# Patient Record
Sex: FEMALE | Race: WHITE | NOT HISPANIC OR LATINO | Employment: OTHER | URBAN - NONMETROPOLITAN AREA
[De-identification: names, ages, dates, MRNs, and addresses within clinical notes are randomized per-mention and may not be internally consistent; named-entity substitution may affect disease eponyms.]

---

## 2018-01-05 ENCOUNTER — TRANSCRIBE ORDERS (OUTPATIENT)
Dept: ADMINISTRATIVE | Facility: HOSPITAL | Age: 63
End: 2018-01-05

## 2018-01-05 ENCOUNTER — APPOINTMENT (OUTPATIENT)
Dept: LAB | Facility: HOSPITAL | Age: 63
End: 2018-01-05
Payer: COMMERCIAL

## 2018-01-05 DIAGNOSIS — I10 ESSENTIAL HYPERTENSION, MALIGNANT: Primary | ICD-10-CM

## 2018-01-05 DIAGNOSIS — E03.9 MYXEDEMA HEART DISEASE: ICD-10-CM

## 2018-01-05 DIAGNOSIS — E78.5 HYPERLIPIDEMIA, UNSPECIFIED HYPERLIPIDEMIA TYPE: ICD-10-CM

## 2018-01-05 DIAGNOSIS — I51.9 MYXEDEMA HEART DISEASE: ICD-10-CM

## 2018-01-05 DIAGNOSIS — I10 ESSENTIAL HYPERTENSION, MALIGNANT: ICD-10-CM

## 2018-01-05 LAB
25(OH)D3 SERPL-MCNC: 44.4 NG/ML (ref 30–100)
ALBUMIN SERPL BCP-MCNC: 3.3 G/DL (ref 3.5–5)
ALP SERPL-CCNC: 79 U/L (ref 46–116)
ALT SERPL W P-5'-P-CCNC: 30 U/L (ref 12–78)
ANION GAP SERPL CALCULATED.3IONS-SCNC: 9 MMOL/L (ref 4–13)
AST SERPL W P-5'-P-CCNC: 22 U/L (ref 5–45)
BASOPHILS # BLD AUTO: 0.02 THOUSANDS/ΜL (ref 0–0.1)
BASOPHILS NFR BLD AUTO: 0 % (ref 0–1)
BILIRUB SERPL-MCNC: 0.3 MG/DL (ref 0.2–1)
BUN SERPL-MCNC: 12 MG/DL (ref 5–25)
CALCIUM SERPL-MCNC: 8.9 MG/DL (ref 8.3–10.1)
CHLORIDE SERPL-SCNC: 106 MMOL/L (ref 100–108)
CHOLEST SERPL-MCNC: 158 MG/DL (ref 50–200)
CO2 SERPL-SCNC: 28 MMOL/L (ref 21–32)
CREAT SERPL-MCNC: 0.86 MG/DL (ref 0.6–1.3)
EOSINOPHIL # BLD AUTO: 0.37 THOUSAND/ΜL (ref 0–0.61)
EOSINOPHIL NFR BLD AUTO: 4 % (ref 0–6)
ERYTHROCYTE [DISTWIDTH] IN BLOOD BY AUTOMATED COUNT: 13.2 % (ref 11.6–15.1)
GFR SERPL CREATININE-BSD FRML MDRD: 74 ML/MIN/1.73SQ M
GLUCOSE P FAST SERPL-MCNC: 94 MG/DL (ref 65–99)
HCT VFR BLD AUTO: 42.4 % (ref 34.8–46.1)
HDLC SERPL-MCNC: 59 MG/DL (ref 40–60)
HGB BLD-MCNC: 14.5 G/DL (ref 11.5–15.4)
LDLC SERPL CALC-MCNC: 82 MG/DL (ref 0–100)
LYMPHOCYTES # BLD AUTO: 2.97 THOUSANDS/ΜL (ref 0.6–4.47)
LYMPHOCYTES NFR BLD AUTO: 35 % (ref 14–44)
MCH RBC QN AUTO: 30.1 PG (ref 26.8–34.3)
MCHC RBC AUTO-ENTMCNC: 34.2 G/DL (ref 31.4–37.4)
MCV RBC AUTO: 88 FL (ref 82–98)
MONOCYTES # BLD AUTO: 0.6 THOUSAND/ΜL (ref 0.17–1.22)
MONOCYTES NFR BLD AUTO: 7 % (ref 4–12)
NEUTROPHILS # BLD AUTO: 4.65 THOUSANDS/ΜL (ref 1.85–7.62)
NEUTS SEG NFR BLD AUTO: 54 % (ref 43–75)
PLATELET # BLD AUTO: 304 THOUSANDS/UL (ref 149–390)
PMV BLD AUTO: 9.8 FL (ref 8.9–12.7)
POTASSIUM SERPL-SCNC: 4 MMOL/L (ref 3.5–5.3)
PROT SERPL-MCNC: 6.4 G/DL (ref 6.4–8.2)
RBC # BLD AUTO: 4.81 MILLION/UL (ref 3.81–5.12)
SODIUM SERPL-SCNC: 143 MMOL/L (ref 136–145)
TRIGL SERPL-MCNC: 83 MG/DL
TSH SERPL DL<=0.05 MIU/L-ACNC: 3.65 UIU/ML (ref 0.36–3.74)
WBC # BLD AUTO: 8.61 THOUSAND/UL (ref 4.31–10.16)

## 2018-01-05 PROCEDURE — 82306 VITAMIN D 25 HYDROXY: CPT

## 2018-01-05 PROCEDURE — 84443 ASSAY THYROID STIM HORMONE: CPT

## 2018-01-05 PROCEDURE — 36415 COLL VENOUS BLD VENIPUNCTURE: CPT

## 2018-01-05 PROCEDURE — 85025 COMPLETE CBC W/AUTO DIFF WBC: CPT

## 2018-01-05 PROCEDURE — 80053 COMPREHEN METABOLIC PANEL: CPT

## 2018-01-05 PROCEDURE — 80061 LIPID PANEL: CPT

## 2018-09-17 ENCOUNTER — APPOINTMENT (OUTPATIENT)
Dept: LAB | Facility: HOSPITAL | Age: 63
End: 2018-09-17
Payer: COMMERCIAL

## 2018-09-17 ENCOUNTER — TRANSCRIBE ORDERS (OUTPATIENT)
Dept: ADMINISTRATIVE | Facility: HOSPITAL | Age: 63
End: 2018-09-17

## 2018-09-17 LAB
BUN SERPL-MCNC: 18 MG/DL (ref 5–25)
CREAT SERPL-MCNC: 0.84 MG/DL (ref 0.6–1.3)
GFR SERPL CREATININE-BSD FRML MDRD: 76 ML/MIN/1.73SQ M

## 2018-09-17 PROCEDURE — 36415 COLL VENOUS BLD VENIPUNCTURE: CPT

## 2018-09-17 PROCEDURE — 82565 ASSAY OF CREATININE: CPT

## 2018-09-17 PROCEDURE — 84520 ASSAY OF UREA NITROGEN: CPT

## 2018-10-06 ENCOUNTER — TRANSCRIBE ORDERS (OUTPATIENT)
Dept: ADMINISTRATIVE | Facility: HOSPITAL | Age: 63
End: 2018-10-06

## 2018-10-06 ENCOUNTER — APPOINTMENT (OUTPATIENT)
Dept: LAB | Facility: HOSPITAL | Age: 63
End: 2018-10-06
Payer: COMMERCIAL

## 2018-10-06 ENCOUNTER — HOSPITAL ENCOUNTER (OUTPATIENT)
Dept: RADIOLOGY | Facility: HOSPITAL | Age: 63
Discharge: HOME | End: 2018-10-06
Payer: COMMERCIAL

## 2018-10-06 LAB
ALBUMIN SERPL BCP-MCNC: 3.7 G/DL (ref 3.5–5)
ALP SERPL-CCNC: 88 U/L (ref 46–116)
ALT SERPL W P-5'-P-CCNC: 32 U/L (ref 12–78)
ANION GAP SERPL CALCULATED.3IONS-SCNC: 8 MMOL/L (ref 4–13)
AST SERPL W P-5'-P-CCNC: 18 U/L (ref 5–45)
BASOPHILS # BLD AUTO: 0.06 THOUSANDS/ΜL (ref 0–0.1)
BASOPHILS NFR BLD AUTO: 1 % (ref 0–1)
BILIRUB SERPL-MCNC: 0.4 MG/DL (ref 0.2–1)
BUN SERPL-MCNC: 11 MG/DL (ref 5–25)
CALCIUM SERPL-MCNC: 9.5 MG/DL (ref 8.3–10.1)
CHLORIDE SERPL-SCNC: 105 MMOL/L (ref 100–108)
CO2 SERPL-SCNC: 30 MMOL/L (ref 21–32)
CREAT SERPL-MCNC: 0.8 MG/DL (ref 0.6–1.3)
EOSINOPHIL # BLD AUTO: 0.44 THOUSAND/ΜL (ref 0–0.61)
EOSINOPHIL NFR BLD AUTO: 5 % (ref 0–6)
ERYTHROCYTE [DISTWIDTH] IN BLOOD BY AUTOMATED COUNT: 13.6 % (ref 11.6–15.1)
GFR SERPL CREATININE-BSD FRML MDRD: 80 ML/MIN/1.73SQ M
GLUCOSE P FAST SERPL-MCNC: 90 MG/DL (ref 65–99)
HCT VFR BLD AUTO: 44.7 % (ref 34.8–46.1)
HGB BLD-MCNC: 14.3 G/DL (ref 11.5–15.4)
IMM GRANULOCYTES # BLD AUTO: 0.02 THOUSAND/UL (ref 0–0.2)
IMM GRANULOCYTES NFR BLD AUTO: 0 % (ref 0–2)
LYMPHOCYTES # BLD AUTO: 3.51 THOUSANDS/ΜL (ref 0.6–4.47)
LYMPHOCYTES NFR BLD AUTO: 37 % (ref 14–44)
MCH RBC QN AUTO: 28.9 PG (ref 26.8–34.3)
MCHC RBC AUTO-ENTMCNC: 32 G/DL (ref 31.4–37.4)
MCV RBC AUTO: 91 FL (ref 82–98)
MONOCYTES # BLD AUTO: 0.72 THOUSAND/ΜL (ref 0.17–1.22)
MONOCYTES NFR BLD AUTO: 8 % (ref 4–12)
NEUTROPHILS # BLD AUTO: 4.64 THOUSANDS/ΜL (ref 1.85–7.62)
NEUTS SEG NFR BLD AUTO: 49 % (ref 43–75)
NRBC BLD AUTO-RTO: 0 /100 WBCS
PLATELET # BLD AUTO: 315 THOUSANDS/UL (ref 149–390)
PMV BLD AUTO: 9.3 FL (ref 8.9–12.7)
POTASSIUM SERPL-SCNC: 4.2 MMOL/L (ref 3.5–5.3)
PROT SERPL-MCNC: 7.1 G/DL (ref 6.4–8.2)
RBC # BLD AUTO: 4.94 MILLION/UL (ref 3.81–5.12)
SODIUM SERPL-SCNC: 143 MMOL/L (ref 136–145)
WBC # BLD AUTO: 9.39 THOUSAND/UL (ref 4.31–10.16)

## 2018-10-06 PROCEDURE — 80053 COMPREHEN METABOLIC PANEL: CPT

## 2018-10-06 PROCEDURE — 36415 COLL VENOUS BLD VENIPUNCTURE: CPT

## 2018-10-06 PROCEDURE — 71046 X-RAY EXAM CHEST 2 VIEWS: CPT

## 2018-10-06 PROCEDURE — 85025 COMPLETE CBC W/AUTO DIFF WBC: CPT

## 2018-10-08 ENCOUNTER — HOSPITAL ENCOUNTER (OUTPATIENT)
Dept: NON INVASIVE DIAGNOSTICS | Facility: HOSPITAL | Age: 63
Discharge: HOME | End: 2018-10-08
Payer: COMMERCIAL

## 2018-10-08 PROCEDURE — 93005 ELECTROCARDIOGRAM TRACING: CPT

## 2018-10-10 LAB
ATRIAL RATE: 70 BPM
P AXIS: 74 DEGREES
PR INTERVAL: 154 MS
QRS AXIS: 68 DEGREES
QRSD INTERVAL: 82 MS
QT INTERVAL: 398 MS
QTC INTERVAL: 429 MS
T WAVE AXIS: 60 DEGREES
VENTRICULAR RATE: 70 BPM

## 2018-10-10 PROCEDURE — 93010 ELECTROCARDIOGRAM REPORT: CPT | Performed by: INTERNAL MEDICINE

## 2018-11-10 ENCOUNTER — TRANSCRIBE ORDERS (OUTPATIENT)
Dept: ADMINISTRATIVE | Facility: HOSPITAL | Age: 63
End: 2018-11-10

## 2018-11-10 ENCOUNTER — APPOINTMENT (OUTPATIENT)
Dept: LAB | Facility: HOSPITAL | Age: 63
End: 2018-11-10
Payer: COMMERCIAL

## 2018-11-10 DIAGNOSIS — E55.9 VITAMIN D DEFICIENCY DISEASE: ICD-10-CM

## 2018-11-10 DIAGNOSIS — I10 ESSENTIAL HYPERTENSION, MALIGNANT: ICD-10-CM

## 2018-11-10 DIAGNOSIS — Z13.9 SCREENING FOR CONDITION: ICD-10-CM

## 2018-11-10 DIAGNOSIS — I10 ESSENTIAL HYPERTENSION, MALIGNANT: Primary | ICD-10-CM

## 2018-11-10 LAB
25(OH)D3 SERPL-MCNC: 41.6 NG/ML (ref 30–100)
CHOLEST SERPL-MCNC: 198 MG/DL (ref 50–200)
HDLC SERPL-MCNC: 73 MG/DL (ref 40–60)
LDLC SERPL CALC-MCNC: 108 MG/DL (ref 0–100)
NONHDLC SERPL-MCNC: 125 MG/DL
TRIGL SERPL-MCNC: 83 MG/DL
TSH SERPL DL<=0.05 MIU/L-ACNC: 2.85 UIU/ML (ref 0.36–3.74)

## 2018-11-10 PROCEDURE — 82306 VITAMIN D 25 HYDROXY: CPT

## 2018-11-10 PROCEDURE — 36415 COLL VENOUS BLD VENIPUNCTURE: CPT

## 2018-11-10 PROCEDURE — 80061 LIPID PANEL: CPT

## 2018-11-10 PROCEDURE — 84443 ASSAY THYROID STIM HORMONE: CPT

## 2019-01-29 ENCOUNTER — TRANSCRIBE ORDERS (OUTPATIENT)
Dept: ADMINISTRATIVE | Facility: HOSPITAL | Age: 64
End: 2019-01-29

## 2019-01-29 ENCOUNTER — HOSPITAL ENCOUNTER (OUTPATIENT)
Dept: RADIOLOGY | Facility: HOSPITAL | Age: 64
Discharge: HOME | End: 2019-01-29
Payer: COMMERCIAL

## 2019-01-29 DIAGNOSIS — T14.90XA TRAUMA: Primary | ICD-10-CM

## 2019-01-29 DIAGNOSIS — R52 PAIN: ICD-10-CM

## 2019-01-29 DIAGNOSIS — T14.90XA TRAUMA: ICD-10-CM

## 2019-01-29 PROCEDURE — 73140 X-RAY EXAM OF FINGER(S): CPT

## 2019-01-29 PROCEDURE — 73564 X-RAY EXAM KNEE 4 OR MORE: CPT

## 2019-01-29 PROCEDURE — 73630 X-RAY EXAM OF FOOT: CPT

## 2019-10-01 ENCOUNTER — TRANSCRIBE ORDERS (OUTPATIENT)
Dept: ADMINISTRATIVE | Facility: HOSPITAL | Age: 64
End: 2019-10-01

## 2019-10-01 ENCOUNTER — APPOINTMENT (OUTPATIENT)
Dept: LAB | Facility: HOSPITAL | Age: 64
End: 2019-10-01
Payer: COMMERCIAL

## 2019-10-01 DIAGNOSIS — Z00.00 ROUTINE MEDICAL EXAM: ICD-10-CM

## 2019-10-01 DIAGNOSIS — E55.9 VITAMIN D DEFICIENCY DISEASE: ICD-10-CM

## 2019-10-01 DIAGNOSIS — I10 ESSENTIAL HYPERTENSION, BENIGN: Primary | ICD-10-CM

## 2019-10-01 DIAGNOSIS — I10 ESSENTIAL HYPERTENSION, BENIGN: ICD-10-CM

## 2019-10-01 LAB
25(OH)D3 SERPL-MCNC: 39.7 NG/ML (ref 30–100)
ALBUMIN SERPL BCP-MCNC: 3.4 G/DL (ref 3.5–5)
ALP SERPL-CCNC: 72 U/L (ref 46–116)
ALT SERPL W P-5'-P-CCNC: 19 U/L (ref 12–78)
ANION GAP SERPL CALCULATED.3IONS-SCNC: 8 MMOL/L (ref 4–13)
AST SERPL W P-5'-P-CCNC: 16 U/L (ref 5–45)
BASOPHILS # BLD AUTO: 0.06 THOUSANDS/ΜL (ref 0–0.1)
BASOPHILS NFR BLD AUTO: 1 % (ref 0–1)
BILIRUB SERPL-MCNC: 0.4 MG/DL (ref 0.2–1)
BUN SERPL-MCNC: 12 MG/DL (ref 5–25)
CALCIUM SERPL-MCNC: 9.3 MG/DL (ref 8.3–10.1)
CHLORIDE SERPL-SCNC: 102 MMOL/L (ref 100–108)
CO2 SERPL-SCNC: 33 MMOL/L (ref 21–32)
CREAT SERPL-MCNC: 0.88 MG/DL (ref 0.6–1.3)
EOSINOPHIL # BLD AUTO: 0.4 THOUSAND/ΜL (ref 0–0.61)
EOSINOPHIL NFR BLD AUTO: 4 % (ref 0–6)
ERYTHROCYTE [DISTWIDTH] IN BLOOD BY AUTOMATED COUNT: 13.9 % (ref 11.6–15.1)
GFR SERPL CREATININE-BSD FRML MDRD: 71 ML/MIN/1.73SQ M
GLUCOSE P FAST SERPL-MCNC: 100 MG/DL (ref 65–99)
HCT VFR BLD AUTO: 43.6 % (ref 34.8–46.1)
HCV AB SER QL: NORMAL
HGB BLD-MCNC: 14.3 G/DL (ref 11.5–15.4)
IMM GRANULOCYTES # BLD AUTO: 0.03 THOUSAND/UL (ref 0–0.2)
IMM GRANULOCYTES NFR BLD AUTO: 0 % (ref 0–2)
LYMPHOCYTES # BLD AUTO: 3.55 THOUSANDS/ΜL (ref 0.6–4.47)
LYMPHOCYTES NFR BLD AUTO: 37 % (ref 14–44)
MCH RBC QN AUTO: 30.2 PG (ref 26.8–34.3)
MCHC RBC AUTO-ENTMCNC: 32.8 G/DL (ref 31.4–37.4)
MCV RBC AUTO: 92 FL (ref 82–98)
MONOCYTES # BLD AUTO: 0.71 THOUSAND/ΜL (ref 0.17–1.22)
MONOCYTES NFR BLD AUTO: 7 % (ref 4–12)
NEUTROPHILS # BLD AUTO: 4.97 THOUSANDS/ΜL (ref 1.85–7.62)
NEUTS SEG NFR BLD AUTO: 51 % (ref 43–75)
NRBC BLD AUTO-RTO: 0 /100 WBCS
PLATELET # BLD AUTO: 295 THOUSANDS/UL (ref 149–390)
PMV BLD AUTO: 9.4 FL (ref 8.9–12.7)
POTASSIUM SERPL-SCNC: 3.3 MMOL/L (ref 3.5–5.3)
PROT SERPL-MCNC: 7.2 G/DL (ref 6.4–8.2)
RBC # BLD AUTO: 4.74 MILLION/UL (ref 3.81–5.12)
SODIUM SERPL-SCNC: 143 MMOL/L (ref 136–145)
TSH SERPL DL<=0.05 MIU/L-ACNC: 6.29 UIU/ML (ref 0.36–3.74)
WBC # BLD AUTO: 9.72 THOUSAND/UL (ref 4.31–10.16)

## 2019-10-01 PROCEDURE — 36415 COLL VENOUS BLD VENIPUNCTURE: CPT

## 2019-10-01 PROCEDURE — 84443 ASSAY THYROID STIM HORMONE: CPT

## 2019-10-01 PROCEDURE — 80053 COMPREHEN METABOLIC PANEL: CPT

## 2019-10-01 PROCEDURE — 82306 VITAMIN D 25 HYDROXY: CPT

## 2019-10-01 PROCEDURE — 85025 COMPLETE CBC W/AUTO DIFF WBC: CPT

## 2019-10-01 PROCEDURE — 86803 HEPATITIS C AB TEST: CPT

## 2019-10-07 ENCOUNTER — TRANSCRIBE ORDERS (OUTPATIENT)
Dept: ADMINISTRATIVE | Facility: HOSPITAL | Age: 64
End: 2019-10-07

## 2019-10-07 ENCOUNTER — APPOINTMENT (OUTPATIENT)
Dept: LAB | Facility: HOSPITAL | Age: 64
End: 2019-10-07
Payer: COMMERCIAL

## 2019-10-07 DIAGNOSIS — E03.9 PRIMARY HYPOTHYROIDISM: Primary | ICD-10-CM

## 2019-10-07 DIAGNOSIS — E03.9 PRIMARY HYPOTHYROIDISM: ICD-10-CM

## 2019-10-07 LAB
T3 SERPL-MCNC: 0.7 NG/ML (ref 0.6–1.8)
T4 SERPL-MCNC: 5.4 UG/DL (ref 4.7–13.3)
TSH SERPL DL<=0.05 MIU/L-ACNC: 2.74 UIU/ML (ref 0.36–3.74)

## 2019-10-07 PROCEDURE — 36415 COLL VENOUS BLD VENIPUNCTURE: CPT

## 2019-10-07 PROCEDURE — 84443 ASSAY THYROID STIM HORMONE: CPT

## 2019-10-07 PROCEDURE — 84480 ASSAY TRIIODOTHYRONINE (T3): CPT

## 2019-10-07 PROCEDURE — 84436 ASSAY OF TOTAL THYROXINE: CPT

## 2020-06-25 ENCOUNTER — OFFICE VISIT (OUTPATIENT)
Dept: FAMILY MEDICINE CLINIC | Facility: CLINIC | Age: 65
End: 2020-06-25
Payer: COMMERCIAL

## 2020-06-25 VITALS
HEART RATE: 84 BPM | WEIGHT: 217 LBS | DIASTOLIC BLOOD PRESSURE: 80 MMHG | RESPIRATION RATE: 20 BRPM | HEIGHT: 64 IN | BODY MASS INDEX: 37.05 KG/M2 | SYSTOLIC BLOOD PRESSURE: 126 MMHG

## 2020-06-25 DIAGNOSIS — I10 ESSENTIAL HYPERTENSION: ICD-10-CM

## 2020-06-25 DIAGNOSIS — F17.200 TOBACCO USE DISORDER: ICD-10-CM

## 2020-06-25 DIAGNOSIS — M77.8 TENDINITIS OF LEFT SHOULDER: Primary | ICD-10-CM

## 2020-06-25 PROCEDURE — 99213 OFFICE O/P EST LOW 20 MIN: CPT | Performed by: FAMILY MEDICINE

## 2020-06-25 PROCEDURE — 3008F BODY MASS INDEX DOCD: CPT | Performed by: FAMILY MEDICINE

## 2020-06-25 PROCEDURE — 3079F DIAST BP 80-89 MM HG: CPT | Performed by: FAMILY MEDICINE

## 2020-06-25 PROCEDURE — 3074F SYST BP LT 130 MM HG: CPT | Performed by: FAMILY MEDICINE

## 2020-06-25 RX ORDER — HYDROCHLOROTHIAZIDE 50 MG/1
50 TABLET ORAL DAILY
COMMUNITY
Start: 2020-06-10 | End: 2020-09-08

## 2020-06-25 RX ORDER — DICLOFENAC SODIUM 75 MG/1
75 TABLET, DELAYED RELEASE ORAL 2 TIMES DAILY
Qty: 60 TABLET | Refills: 2 | Status: SHIPPED | OUTPATIENT
Start: 2020-06-25 | End: 2022-04-08 | Stop reason: SDUPTHER

## 2020-06-25 RX ORDER — COLESEVELAM HYDROCHLORIDE 625 MG/1
TABLET, FILM COATED ORAL
COMMUNITY
Start: 2020-03-30 | End: 2020-06-25

## 2020-06-25 RX ORDER — METHYLPREDNISOLONE 4 MG/1
TABLET ORAL
Qty: 21 EACH | Refills: 0 | Status: SHIPPED | OUTPATIENT
Start: 2020-06-25 | End: 2020-07-16

## 2020-06-25 RX ORDER — MECLIZINE HYDROCHLORIDE 25 MG/1
25 TABLET ORAL 3 TIMES DAILY PRN
COMMUNITY
End: 2022-04-08 | Stop reason: SDUPTHER

## 2020-06-25 RX ORDER — AMLODIPINE BESYLATE AND BENAZEPRIL HYDROCHLORIDE 10; 20 MG/1; MG/1
1 CAPSULE ORAL DAILY
COMMUNITY
Start: 2020-04-09 | End: 2020-07-08

## 2020-06-25 RX ORDER — COLESEVELAM 180 1/1
1250 TABLET ORAL 3 TIMES DAILY
COMMUNITY
End: 2020-09-28

## 2020-06-29 ENCOUNTER — TELEPHONE (OUTPATIENT)
Dept: FAMILY MEDICINE CLINIC | Facility: CLINIC | Age: 65
End: 2020-06-29

## 2020-07-08 DIAGNOSIS — I10 ESSENTIAL HYPERTENSION: Primary | ICD-10-CM

## 2020-07-08 RX ORDER — AMLODIPINE BESYLATE AND BENAZEPRIL HYDROCHLORIDE 10; 20 MG/1; MG/1
CAPSULE ORAL
Qty: 90 CAPSULE | Refills: 3 | Status: SHIPPED | OUTPATIENT
Start: 2020-07-08 | End: 2021-04-02 | Stop reason: SDUPTHER

## 2020-07-16 ENCOUNTER — OFFICE VISIT (OUTPATIENT)
Dept: FAMILY MEDICINE CLINIC | Facility: CLINIC | Age: 65
End: 2020-07-16
Payer: COMMERCIAL

## 2020-07-16 VITALS
SYSTOLIC BLOOD PRESSURE: 134 MMHG | RESPIRATION RATE: 20 BRPM | BODY MASS INDEX: 37.08 KG/M2 | HEART RATE: 68 BPM | DIASTOLIC BLOOD PRESSURE: 78 MMHG | WEIGHT: 216 LBS

## 2020-07-16 DIAGNOSIS — I10 ESSENTIAL HYPERTENSION: ICD-10-CM

## 2020-07-16 DIAGNOSIS — M77.8 LEFT WRIST TENDINITIS: Primary | ICD-10-CM

## 2020-07-16 DIAGNOSIS — F17.200 TOBACCO USE DISORDER: ICD-10-CM

## 2020-07-16 PROCEDURE — 99406 BEHAV CHNG SMOKING 3-10 MIN: CPT | Performed by: FAMILY MEDICINE

## 2020-07-16 PROCEDURE — 3075F SYST BP GE 130 - 139MM HG: CPT | Performed by: FAMILY MEDICINE

## 2020-07-16 PROCEDURE — 99213 OFFICE O/P EST LOW 20 MIN: CPT | Performed by: FAMILY MEDICINE

## 2020-07-16 PROCEDURE — 3078F DIAST BP <80 MM HG: CPT | Performed by: FAMILY MEDICINE

## 2020-07-16 NOTE — PROGRESS NOTES
Assessment/Plan:  Tendinitis of the left wrist the patient was told to minimize use for approximately a week  Was told that she should take the diclofenac that she has on hand  Tobacco use disorder spent approximately 3 minutes discussing tobacco use patient does not want pharmacological intervention at this time says she will try and cut back on her own    Problem List Items Addressed This Visit     None           There are no diagnoses linked to this encounter  No problem-specific Assessment & Plan notes found for this encounter  PHQ-9 Depression Screening    PHQ-9:    Frequency of the following problems over the past two weeks: Body mass index is 37 08 kg/m²  BMI Counseling: Body mass index is 37 08 kg/m²  The BMI is above normal  Nutrition recommendations include reducing portion sizes, decreasing overall calorie intake, 3-5 servings of fruits/vegetables daily, reducing fast food intake, consuming healthier snacks, decreasing soda and/or juice intake, moderation in carbohydrate intake, increasing intake of lean protein, reducing intake of saturated fat and trans fat and reducing intake of cholesterol  Subjective:      Patient ID: Papo Fuller is a 60 y o  female  Patient presents with pain and swelling of the left wrist      The following portions of the patient's history were reviewed and updated as appropriate:   She has no past medical history on file  ,  does not have a problem list on file  ,   has no past surgical history on file  ,  family history is not on file  ,   reports that she has been smoking  She has been smoking about 0 75 packs per day  She has never used smokeless tobacco  She reports that she does not drink alcohol or use drugs  ,  has No Known Allergies     Current Outpatient Medications   Medication Sig Dispense Refill    amLODIPine-benazepril (LOTREL) 10-20 MG per capsule TAKE 1 CAPSULE DAILY 90 capsule 3    colesevelam (WELCHOL) 625 mg tablet Take 1,250 mg by mouth 3 (three) times a day       diclofenac (VOLTAREN) 75 mg EC tablet Take 1 tablet (75 mg total) by mouth 2 (two) times a day 60 tablet 2    hydrochlorothiazide (HYDRODIURIL) 50 mg tablet Take 50 mg by mouth daily       meclizine (ANTIVERT) 25 mg tablet Take 25 mg by mouth Three times daily as needed       No current facility-administered medications for this visit  Review of Systems   Constitutional: Negative  HENT: Negative  Eyes: Negative  Respiratory: Negative  Cardiovascular: Negative  Gastrointestinal: Negative  Endocrine: Negative  Genitourinary: Negative  Musculoskeletal: Positive for arthralgias and joint swelling  Left wrist swelling   Skin: Negative  Allergic/Immunologic: Negative  Neurological: Negative  Hematological: Negative  Psychiatric/Behavioral: Negative  Objective:    /78   Pulse 68   Resp 20   Wt 98 kg (216 lb)   BMI 37 08 kg/m²   Body mass index is 37 08 kg/m²       Physical Exam   Musculoskeletal:   Left wrist extensor tendo itis

## 2020-09-08 DIAGNOSIS — I10 ESSENTIAL HYPERTENSION: Primary | ICD-10-CM

## 2020-09-08 RX ORDER — HYDROCHLOROTHIAZIDE 50 MG/1
TABLET ORAL
Qty: 90 TABLET | Refills: 3 | Status: SHIPPED | OUTPATIENT
Start: 2020-09-08 | End: 2021-09-03

## 2020-09-23 ENCOUNTER — APPOINTMENT (OUTPATIENT)
Dept: LAB | Facility: HOSPITAL | Age: 65
End: 2020-09-23
Attending: FAMILY MEDICINE
Payer: COMMERCIAL

## 2020-09-23 ENCOUNTER — TRANSCRIBE ORDERS (OUTPATIENT)
Dept: ADMINISTRATIVE | Facility: HOSPITAL | Age: 65
End: 2020-09-23

## 2020-09-23 DIAGNOSIS — E55.9 VITAMIN D DEFICIENCY: ICD-10-CM

## 2020-09-23 DIAGNOSIS — E55.9 VITAMIN D DEFICIENCY: Primary | ICD-10-CM

## 2020-09-23 DIAGNOSIS — R73.03 PRE-DIABETES: ICD-10-CM

## 2020-09-23 DIAGNOSIS — Z00.00 ROUTINE ADULT HEALTH MAINTENANCE: ICD-10-CM

## 2020-09-23 LAB
25(OH)D3 SERPL-MCNC: 43.4 NG/ML (ref 30–100)
ALBUMIN SERPL BCP-MCNC: 3.5 G/DL (ref 3.5–5)
ALP SERPL-CCNC: 75 U/L (ref 46–116)
ALT SERPL W P-5'-P-CCNC: 25 U/L (ref 12–78)
ANION GAP SERPL CALCULATED.3IONS-SCNC: 5 MMOL/L (ref 4–13)
AST SERPL W P-5'-P-CCNC: 18 U/L (ref 5–45)
BASOPHILS # BLD AUTO: 0.07 THOUSANDS/ΜL (ref 0–0.1)
BASOPHILS NFR BLD AUTO: 1 % (ref 0–1)
BILIRUB SERPL-MCNC: 0.4 MG/DL (ref 0.2–1)
BUN SERPL-MCNC: 15 MG/DL (ref 5–25)
CALCIUM SERPL-MCNC: 9.4 MG/DL (ref 8.3–10.1)
CHLORIDE SERPL-SCNC: 101 MMOL/L (ref 100–108)
CHOLEST SERPL-MCNC: 189 MG/DL (ref 50–200)
CO2 SERPL-SCNC: 33 MMOL/L (ref 21–32)
CREAT SERPL-MCNC: 0.88 MG/DL (ref 0.6–1.3)
EOSINOPHIL # BLD AUTO: 0.42 THOUSAND/ΜL (ref 0–0.61)
EOSINOPHIL NFR BLD AUTO: 4 % (ref 0–6)
ERYTHROCYTE [DISTWIDTH] IN BLOOD BY AUTOMATED COUNT: 13.2 % (ref 11.6–15.1)
GFR SERPL CREATININE-BSD FRML MDRD: 71 ML/MIN/1.73SQ M
GLUCOSE P FAST SERPL-MCNC: 99 MG/DL (ref 65–99)
HCT VFR BLD AUTO: 42.8 % (ref 34.8–46.1)
HDLC SERPL-MCNC: 63 MG/DL
HGB BLD-MCNC: 14.2 G/DL (ref 11.5–15.4)
IMM GRANULOCYTES # BLD AUTO: 0.02 THOUSAND/UL (ref 0–0.2)
IMM GRANULOCYTES NFR BLD AUTO: 0 % (ref 0–2)
LDLC SERPL CALC-MCNC: 97 MG/DL (ref 0–100)
LYMPHOCYTES # BLD AUTO: 4.18 THOUSANDS/ΜL (ref 0.6–4.47)
LYMPHOCYTES NFR BLD AUTO: 38 % (ref 14–44)
MCH RBC QN AUTO: 30.1 PG (ref 26.8–34.3)
MCHC RBC AUTO-ENTMCNC: 33.2 G/DL (ref 31.4–37.4)
MCV RBC AUTO: 91 FL (ref 82–98)
MONOCYTES # BLD AUTO: 0.79 THOUSAND/ΜL (ref 0.17–1.22)
MONOCYTES NFR BLD AUTO: 7 % (ref 4–12)
NEUTROPHILS # BLD AUTO: 5.45 THOUSANDS/ΜL (ref 1.85–7.62)
NEUTS SEG NFR BLD AUTO: 50 % (ref 43–75)
NONHDLC SERPL-MCNC: 126 MG/DL
NRBC BLD AUTO-RTO: 0 /100 WBCS
PLATELET # BLD AUTO: 343 THOUSANDS/UL (ref 149–390)
PMV BLD AUTO: 9.2 FL (ref 8.9–12.7)
POTASSIUM SERPL-SCNC: 3.3 MMOL/L (ref 3.5–5.3)
PROT SERPL-MCNC: 7.4 G/DL (ref 6.4–8.2)
RBC # BLD AUTO: 4.71 MILLION/UL (ref 3.81–5.12)
SODIUM SERPL-SCNC: 139 MMOL/L (ref 136–145)
TRIGL SERPL-MCNC: 145 MG/DL
TSH SERPL DL<=0.05 MIU/L-ACNC: 7.1 UIU/ML (ref 0.36–3.74)
WBC # BLD AUTO: 10.93 THOUSAND/UL (ref 4.31–10.16)

## 2020-09-23 PROCEDURE — 84443 ASSAY THYROID STIM HORMONE: CPT

## 2020-09-23 PROCEDURE — 36415 COLL VENOUS BLD VENIPUNCTURE: CPT

## 2020-09-23 PROCEDURE — 82306 VITAMIN D 25 HYDROXY: CPT

## 2020-09-23 PROCEDURE — 80053 COMPREHEN METABOLIC PANEL: CPT

## 2020-09-23 PROCEDURE — 80061 LIPID PANEL: CPT

## 2020-09-23 PROCEDURE — 85025 COMPLETE CBC W/AUTO DIFF WBC: CPT

## 2020-09-24 ENCOUNTER — OFFICE VISIT (OUTPATIENT)
Dept: FAMILY MEDICINE CLINIC | Facility: CLINIC | Age: 65
End: 2020-09-24
Payer: COMMERCIAL

## 2020-09-24 VITALS
RESPIRATION RATE: 20 BRPM | BODY MASS INDEX: 35.16 KG/M2 | HEART RATE: 68 BPM | SYSTOLIC BLOOD PRESSURE: 142 MMHG | DIASTOLIC BLOOD PRESSURE: 84 MMHG | TEMPERATURE: 97.7 F | WEIGHT: 211 LBS | HEIGHT: 65 IN

## 2020-09-24 DIAGNOSIS — I10 ESSENTIAL HYPERTENSION: ICD-10-CM

## 2020-09-24 DIAGNOSIS — F17.200 TOBACCO USE DISORDER: ICD-10-CM

## 2020-09-24 DIAGNOSIS — E66.09 CLASS 2 OBESITY DUE TO EXCESS CALORIES WITHOUT SERIOUS COMORBIDITY WITH BODY MASS INDEX (BMI) OF 35.0 TO 35.9 IN ADULT: ICD-10-CM

## 2020-09-24 DIAGNOSIS — E03.9 HYPOTHYROIDISM, UNSPECIFIED TYPE: Primary | ICD-10-CM

## 2020-09-24 PROCEDURE — 99214 OFFICE O/P EST MOD 30 MIN: CPT | Performed by: FAMILY MEDICINE

## 2020-09-24 NOTE — PROGRESS NOTES
Assessment/Plan elevated thyroid-stimulating hormone the plan is to do a thyroid ultrasound and thyroid scan along with an anti thyroid antibody as well as a T3-T4 TSH and consider therapy from there    Essential hypertension blood pressure elevated today discussed staying away from salt diet and exercise will be rechecking the blood pressure in 2 weeks  Tobacco use disorder discussed tobacco use with the patient discussed states she is not ready to discontinue tobacco at this lety    Morbid obesity diet and exercise discussed:    Problem List Items Addressed This Visit     None           There are no diagnoses linked to this encounter  No problem-specific Assessment & Plan notes found for this encounter  PHQ-9 Depression Screening    PHQ-9:    Frequency of the following problems over the past two weeks: Body mass index is 35 66 kg/m²  BMI Counseling: Body mass index is 35 66 kg/m²  The BMI is above normal  Nutrition recommendations include reducing portion sizes, decreasing overall calorie intake, 3-5 servings of fruits/vegetables daily, reducing fast food intake, consuming healthier snacks, decreasing soda and/or juice intake, moderation in carbohydrate intake, increasing intake of lean protein, reducing intake of saturated fat and trans fat and reducing intake of cholesterol  Exercise recommendations include moderate aerobic physical activity for 150 minutes/week and exercising 3-5 times per week  Subjective:      Patient ID: Ese Blackmon is a 60 y o  female  Patient presents for checkup to go over lab results      The following portions of the patient's history were reviewed and updated as appropriate:   She has no past medical history on file  ,  does not have a problem list on file  ,   has no past surgical history on file  ,  family history is not on file  ,   reports that she has been smoking  She has been smoking about 0 75 packs per day   She has never used smokeless tobacco  She reports that she does not drink alcohol or use drugs  ,  has No Known Allergies     Current Outpatient Medications   Medication Sig Dispense Refill    amLODIPine-benazepril (LOTREL) 10-20 MG per capsule TAKE 1 CAPSULE DAILY 90 capsule 3    colesevelam (WELCHOL) 625 mg tablet Take 1,250 mg by mouth 3 (three) times a day       diclofenac (VOLTAREN) 75 mg EC tablet Take 1 tablet (75 mg total) by mouth 2 (two) times a day 60 tablet 2    hydrochlorothiazide (HYDRODIURIL) 50 mg tablet TAKE 1 TABLET DAILY 90 tablet 3    meclizine (ANTIVERT) 25 mg tablet Take 25 mg by mouth Three times daily as needed       No current facility-administered medications for this visit  Review of Systems   Constitutional: Negative  HENT: Negative  Eyes: Negative  Respiratory: Negative  Cardiovascular: Negative  Gastrointestinal: Negative  Endocrine: Negative  Genitourinary: Negative  Musculoskeletal: Negative  Skin: Negative  Allergic/Immunologic: Negative  Neurological: Negative  Hematological: Negative  Psychiatric/Behavioral: Negative  Objective:    /84   Pulse 68   Temp 97 7 °F (36 5 °C)   Resp 20   Ht 5' 4 5" (1 638 m)   Wt 95 7 kg (211 lb)   BMI 35 66 kg/m²   Body mass index is 35 66 kg/m²  Physical Exam  Constitutional:       Appearance: She is well-developed  She is obese  HENT:      Head: Normocephalic  Eyes:      Pupils: Pupils are equal, round, and reactive to light  Neck:      Musculoskeletal: Normal range of motion  Cardiovascular:      Rate and Rhythm: Normal rate and regular rhythm  Heart sounds: Normal heart sounds  Pulmonary:      Effort: Pulmonary effort is normal       Breath sounds: Normal breath sounds  Abdominal:      General: Bowel sounds are normal       Palpations: Abdomen is soft  Tenderness: There is no abdominal tenderness  Skin:     General: Skin is warm     Neurological:      Mental Status: She is alert and oriented to person, place, and time

## 2020-09-25 ENCOUNTER — HOSPITAL ENCOUNTER (OUTPATIENT)
Dept: ULTRASOUND IMAGING | Facility: HOSPITAL | Age: 65
Discharge: HOME | End: 2020-09-25
Attending: FAMILY MEDICINE
Payer: COMMERCIAL

## 2020-09-25 DIAGNOSIS — E03.9 HYPOTHYROIDISM, UNSPECIFIED TYPE: ICD-10-CM

## 2020-09-25 PROCEDURE — 76536 US EXAM OF HEAD AND NECK: CPT

## 2020-09-26 DIAGNOSIS — E78.5 DYSLIPIDEMIA: Primary | ICD-10-CM

## 2020-09-28 RX ORDER — COLESEVELAM HYDROCHLORIDE 625 MG/1
TABLET, FILM COATED ORAL
Qty: 540 TABLET | Refills: 3 | Status: SHIPPED | OUTPATIENT
Start: 2020-09-28 | End: 2021-09-03

## 2020-09-30 ENCOUNTER — TELEPHONE (OUTPATIENT)
Dept: FAMILY MEDICINE CLINIC | Facility: CLINIC | Age: 65
End: 2020-09-30

## 2020-10-01 DIAGNOSIS — E03.9 HYPOTHYROIDISM, UNSPECIFIED TYPE: Primary | ICD-10-CM

## 2020-10-01 RX ORDER — LEVOTHYROXINE SODIUM 0.03 MG/1
25 TABLET ORAL
Qty: 90 TABLET | Refills: 3 | Status: SHIPPED | OUTPATIENT
Start: 2020-10-01 | End: 2021-04-02 | Stop reason: SDUPTHER

## 2020-10-01 NOTE — TELEPHONE ENCOUNTER
Attempted to call patient  Left message on voicemail to for patient to contact office 10/02/2020 to discuss

## 2020-10-06 ENCOUNTER — OFFICE VISIT (OUTPATIENT)
Dept: FAMILY MEDICINE CLINIC | Facility: CLINIC | Age: 65
End: 2020-10-06
Payer: COMMERCIAL

## 2020-10-06 VITALS
HEIGHT: 63 IN | RESPIRATION RATE: 20 BRPM | BODY MASS INDEX: 37.39 KG/M2 | TEMPERATURE: 97.4 F | DIASTOLIC BLOOD PRESSURE: 84 MMHG | WEIGHT: 211 LBS | SYSTOLIC BLOOD PRESSURE: 152 MMHG | HEART RATE: 68 BPM

## 2020-10-06 DIAGNOSIS — E66.09 CLASS 2 OBESITY DUE TO EXCESS CALORIES WITHOUT SERIOUS COMORBIDITY WITH BODY MASS INDEX (BMI) OF 35.0 TO 35.9 IN ADULT: ICD-10-CM

## 2020-10-06 DIAGNOSIS — E03.9 HYPOTHYROIDISM, UNSPECIFIED TYPE: Primary | ICD-10-CM

## 2020-10-06 DIAGNOSIS — I10 ESSENTIAL HYPERTENSION: ICD-10-CM

## 2020-10-06 PROCEDURE — 99396 PREV VISIT EST AGE 40-64: CPT | Performed by: FAMILY MEDICINE

## 2020-10-06 PROCEDURE — 3079F DIAST BP 80-89 MM HG: CPT | Performed by: FAMILY MEDICINE

## 2020-10-06 PROCEDURE — 4004F PT TOBACCO SCREEN RCVD TLK: CPT | Performed by: FAMILY MEDICINE

## 2020-10-07 ENCOUNTER — TELEPHONE (OUTPATIENT)
Dept: ADMINISTRATIVE | Facility: OTHER | Age: 65
End: 2020-10-07

## 2020-10-20 ENCOUNTER — HOSPITAL ENCOUNTER (OUTPATIENT)
Dept: NUCLEAR MEDICINE | Facility: HOSPITAL | Age: 65
Discharge: HOME | End: 2020-10-20
Payer: COMMERCIAL

## 2020-10-20 DIAGNOSIS — E03.9 HYPOTHYROIDISM, UNSPECIFIED TYPE: ICD-10-CM

## 2020-10-20 PROCEDURE — G1004 CDSM NDSC: HCPCS

## 2020-10-20 PROCEDURE — A9516 IODINE I-123 SOD IODIDE MIC: HCPCS

## 2020-10-20 PROCEDURE — 78014 THYROID IMAGING W/BLOOD FLOW: CPT

## 2020-10-21 ENCOUNTER — HOSPITAL ENCOUNTER (OUTPATIENT)
Dept: NUCLEAR MEDICINE | Facility: HOSPITAL | Age: 65
Discharge: HOME | End: 2020-10-21
Payer: COMMERCIAL

## 2020-10-23 ENCOUNTER — TELEPHONE (OUTPATIENT)
Dept: ADMINISTRATIVE | Facility: OTHER | Age: 65
End: 2020-10-23

## 2020-11-30 ENCOUNTER — TELEPHONE (OUTPATIENT)
Dept: FAMILY MEDICINE CLINIC | Facility: CLINIC | Age: 65
End: 2020-11-30

## 2020-11-30 DIAGNOSIS — Z20.822 CLOSE EXPOSURE TO COVID-19 VIRUS: Primary | ICD-10-CM

## 2020-11-30 DIAGNOSIS — R51.9 NONINTRACTABLE HEADACHE, UNSPECIFIED CHRONICITY PATTERN, UNSPECIFIED HEADACHE TYPE: ICD-10-CM

## 2020-11-30 DIAGNOSIS — Z01.89 PATIENT REQUEST FOR DIAGNOSTIC TESTING: Primary | ICD-10-CM

## 2020-12-02 DIAGNOSIS — Z20.822 CLOSE EXPOSURE TO COVID-19 VIRUS: ICD-10-CM

## 2020-12-02 DIAGNOSIS — R51.9 NONINTRACTABLE HEADACHE, UNSPECIFIED CHRONICITY PATTERN, UNSPECIFIED HEADACHE TYPE: ICD-10-CM

## 2020-12-02 PROCEDURE — U0003 INFECTIOUS AGENT DETECTION BY NUCLEIC ACID (DNA OR RNA); SEVERE ACUTE RESPIRATORY SYNDROME CORONAVIRUS 2 (SARS-COV-2) (CORONAVIRUS DISEASE [COVID-19]), AMPLIFIED PROBE TECHNIQUE, MAKING USE OF HIGH THROUGHPUT TECHNOLOGIES AS DESCRIBED BY CMS-2020-01-R: HCPCS | Performed by: FAMILY MEDICINE

## 2020-12-03 LAB — SARS-COV-2 RNA SPEC QL NAA+PROBE: NOT DETECTED

## 2020-12-04 ENCOUNTER — TELEPHONE (OUTPATIENT)
Dept: FAMILY MEDICINE CLINIC | Facility: CLINIC | Age: 65
End: 2020-12-04

## 2020-12-05 ENCOUNTER — LAB (OUTPATIENT)
Dept: LAB | Facility: HOSPITAL | Age: 65
End: 2020-12-05
Attending: FAMILY MEDICINE
Payer: COMMERCIAL

## 2020-12-05 DIAGNOSIS — Z01.89 PATIENT REQUEST FOR DIAGNOSTIC TESTING: ICD-10-CM

## 2020-12-05 DIAGNOSIS — E03.9 HYPOTHYROIDISM, UNSPECIFIED TYPE: ICD-10-CM

## 2020-12-05 LAB
ABO GROUP BLD: NORMAL
RH BLD: POSITIVE
T3 SERPL-MCNC: 0.8 NG/ML (ref 0.6–1.8)
T4 FREE SERPL-MCNC: 1.12 NG/DL (ref 0.76–1.46)
TSH SERPL DL<=0.05 MIU/L-ACNC: 2.72 UIU/ML (ref 0.36–3.74)

## 2020-12-05 PROCEDURE — 86800 THYROGLOBULIN ANTIBODY: CPT

## 2020-12-05 PROCEDURE — 86900 BLOOD TYPING SEROLOGIC ABO: CPT

## 2020-12-05 PROCEDURE — 84443 ASSAY THYROID STIM HORMONE: CPT

## 2020-12-05 PROCEDURE — 84439 ASSAY OF FREE THYROXINE: CPT

## 2020-12-05 PROCEDURE — 86901 BLOOD TYPING SEROLOGIC RH(D): CPT

## 2020-12-05 PROCEDURE — 36415 COLL VENOUS BLD VENIPUNCTURE: CPT

## 2020-12-05 PROCEDURE — 84480 ASSAY TRIIODOTHYRONINE (T3): CPT

## 2020-12-05 PROCEDURE — 86376 MICROSOMAL ANTIBODY EACH: CPT

## 2020-12-06 LAB — THYROPEROXIDASE AB SERPL-ACNC: <9 IU/ML (ref 0–34)

## 2020-12-08 LAB — THYROGLOB AB SERPL-ACNC: <1 IU/ML (ref 0–0.9)

## 2021-01-15 ENCOUNTER — TELEPHONE (OUTPATIENT)
Dept: FAMILY MEDICINE CLINIC | Facility: CLINIC | Age: 66
End: 2021-01-15

## 2021-01-15 DIAGNOSIS — E03.9 HYPOTHYROIDISM, UNSPECIFIED TYPE: Primary | ICD-10-CM

## 2021-01-15 DIAGNOSIS — E55.9 VITAMIN D DEFICIENCY: ICD-10-CM

## 2021-01-15 DIAGNOSIS — E78.5 DYSLIPIDEMIA: ICD-10-CM

## 2021-03-11 ENCOUNTER — TELEPHONE (OUTPATIENT)
Dept: ADMINISTRATIVE | Facility: OTHER | Age: 66
End: 2021-03-11

## 2021-03-11 NOTE — TELEPHONE ENCOUNTER
----- Message from Román Salmeron sent at 3/11/2021 10:55 AM EST -----  Regarding: Care Gaps Request  03/11/21 10:55 AM    Hello, our patient Saintclair Golden has had CRC: Colonoscopy completed/performed  Please assist in updating the patient chart by making an External outreach to [facility]  The date of service is most recent      Thank you,  Román Salmeron  PG 2 [facility]

## 2021-03-18 NOTE — TELEPHONE ENCOUNTER
Upon review of the In Basket request we have found this is a duplicate request and no further action is needed  This request was completed upon initial request, the patient chart is up to date, and this message will now be closed  Any additional questions or concerns should be emailed to the Practice Liaisons via [email or web address] email, please do not reply via In Basket      Thank you  Rekha Palacios

## 2021-03-25 ENCOUNTER — APPOINTMENT (OUTPATIENT)
Dept: LAB | Facility: HOSPITAL | Age: 66
End: 2021-03-25
Attending: FAMILY MEDICINE
Payer: COMMERCIAL

## 2021-03-25 DIAGNOSIS — E03.9 HYPOTHYROIDISM, UNSPECIFIED TYPE: ICD-10-CM

## 2021-03-25 DIAGNOSIS — E55.9 VITAMIN D DEFICIENCY: ICD-10-CM

## 2021-03-25 DIAGNOSIS — E78.5 DYSLIPIDEMIA: ICD-10-CM

## 2021-03-25 LAB
25(OH)D3 SERPL-MCNC: 37.3 NG/ML (ref 30–100)
ALBUMIN SERPL BCP-MCNC: 3.4 G/DL (ref 3.5–5)
ALP SERPL-CCNC: 79 U/L (ref 46–116)
ALT SERPL W P-5'-P-CCNC: 25 U/L (ref 12–78)
ANION GAP SERPL CALCULATED.3IONS-SCNC: 8 MMOL/L (ref 4–13)
AST SERPL W P-5'-P-CCNC: 17 U/L (ref 5–45)
BASOPHILS # BLD AUTO: 0.07 THOUSANDS/ΜL (ref 0–0.1)
BASOPHILS NFR BLD AUTO: 1 % (ref 0–1)
BILIRUB SERPL-MCNC: 0.4 MG/DL (ref 0.2–1)
BUN SERPL-MCNC: 17 MG/DL (ref 5–25)
CALCIUM ALBUM COR SERPL-MCNC: 10.1 MG/DL (ref 8.3–10.1)
CALCIUM SERPL-MCNC: 9.6 MG/DL (ref 8.3–10.1)
CHLORIDE SERPL-SCNC: 102 MMOL/L (ref 100–108)
CO2 SERPL-SCNC: 32 MMOL/L (ref 21–32)
CREAT SERPL-MCNC: 0.93 MG/DL (ref 0.6–1.3)
EOSINOPHIL # BLD AUTO: 0.48 THOUSAND/ΜL (ref 0–0.61)
EOSINOPHIL NFR BLD AUTO: 4 % (ref 0–6)
ERYTHROCYTE [DISTWIDTH] IN BLOOD BY AUTOMATED COUNT: 13.6 % (ref 11.6–15.1)
GFR SERPL CREATININE-BSD FRML MDRD: 66 ML/MIN/1.73SQ M
GLUCOSE P FAST SERPL-MCNC: 105 MG/DL (ref 65–99)
HCT VFR BLD AUTO: 44.5 % (ref 34.8–46.1)
HGB BLD-MCNC: 14.3 G/DL (ref 11.5–15.4)
IMM GRANULOCYTES # BLD AUTO: 0.03 THOUSAND/UL (ref 0–0.2)
IMM GRANULOCYTES NFR BLD AUTO: 0 % (ref 0–2)
LYMPHOCYTES # BLD AUTO: 4.12 THOUSANDS/ΜL (ref 0.6–4.47)
LYMPHOCYTES NFR BLD AUTO: 37 % (ref 14–44)
MCH RBC QN AUTO: 28.9 PG (ref 26.8–34.3)
MCHC RBC AUTO-ENTMCNC: 32.1 G/DL (ref 31.4–37.4)
MCV RBC AUTO: 90 FL (ref 82–98)
MONOCYTES # BLD AUTO: 0.84 THOUSAND/ΜL (ref 0.17–1.22)
MONOCYTES NFR BLD AUTO: 8 % (ref 4–12)
NEUTROPHILS # BLD AUTO: 5.63 THOUSANDS/ΜL (ref 1.85–7.62)
NEUTS SEG NFR BLD AUTO: 50 % (ref 43–75)
NRBC BLD AUTO-RTO: 0 /100 WBCS
PLATELET # BLD AUTO: 350 THOUSANDS/UL (ref 149–390)
PMV BLD AUTO: 9.5 FL (ref 8.9–12.7)
POTASSIUM SERPL-SCNC: 3.6 MMOL/L (ref 3.5–5.3)
PROT SERPL-MCNC: 7.4 G/DL (ref 6.4–8.2)
RBC # BLD AUTO: 4.94 MILLION/UL (ref 3.81–5.12)
SODIUM SERPL-SCNC: 142 MMOL/L (ref 136–145)
TSH SERPL DL<=0.05 MIU/L-ACNC: 3.43 UIU/ML (ref 0.36–3.74)
WBC # BLD AUTO: 11.17 THOUSAND/UL (ref 4.31–10.16)

## 2021-03-25 PROCEDURE — 84443 ASSAY THYROID STIM HORMONE: CPT

## 2021-03-25 PROCEDURE — 82306 VITAMIN D 25 HYDROXY: CPT

## 2021-03-25 PROCEDURE — 36415 COLL VENOUS BLD VENIPUNCTURE: CPT

## 2021-03-25 PROCEDURE — 85025 COMPLETE CBC W/AUTO DIFF WBC: CPT

## 2021-03-25 PROCEDURE — 80053 COMPREHEN METABOLIC PANEL: CPT

## 2021-04-02 ENCOUNTER — OFFICE VISIT (OUTPATIENT)
Dept: FAMILY MEDICINE CLINIC | Facility: CLINIC | Age: 66
End: 2021-04-02
Payer: COMMERCIAL

## 2021-04-02 VITALS
DIASTOLIC BLOOD PRESSURE: 76 MMHG | HEART RATE: 84 BPM | HEIGHT: 63 IN | RESPIRATION RATE: 16 BRPM | WEIGHT: 216 LBS | BODY MASS INDEX: 38.27 KG/M2 | TEMPERATURE: 97.5 F | SYSTOLIC BLOOD PRESSURE: 132 MMHG

## 2021-04-02 DIAGNOSIS — E78.5 DYSLIPIDEMIA: ICD-10-CM

## 2021-04-02 DIAGNOSIS — R73.01 ELEVATED FASTING BLOOD SUGAR: Primary | ICD-10-CM

## 2021-04-02 DIAGNOSIS — E03.9 HYPOTHYROIDISM, UNSPECIFIED TYPE: ICD-10-CM

## 2021-04-02 DIAGNOSIS — I10 ESSENTIAL HYPERTENSION: ICD-10-CM

## 2021-04-02 PROCEDURE — 4004F PT TOBACCO SCREEN RCVD TLK: CPT | Performed by: FAMILY MEDICINE

## 2021-04-02 PROCEDURE — 3078F DIAST BP <80 MM HG: CPT | Performed by: FAMILY MEDICINE

## 2021-04-02 PROCEDURE — 3075F SYST BP GE 130 - 139MM HG: CPT | Performed by: FAMILY MEDICINE

## 2021-04-02 PROCEDURE — 99213 OFFICE O/P EST LOW 20 MIN: CPT | Performed by: FAMILY MEDICINE

## 2021-04-02 PROCEDURE — 3008F BODY MASS INDEX DOCD: CPT | Performed by: FAMILY MEDICINE

## 2021-04-02 RX ORDER — LEVOTHYROXINE SODIUM 0.03 MG/1
25 TABLET ORAL
Qty: 90 TABLET | Refills: 1 | Status: SHIPPED | OUTPATIENT
Start: 2021-04-02 | End: 2021-09-13

## 2021-04-02 RX ORDER — AMLODIPINE BESYLATE AND BENAZEPRIL HYDROCHLORIDE 10; 20 MG/1; MG/1
1 CAPSULE ORAL DAILY
Qty: 90 CAPSULE | Refills: 1 | Status: SHIPPED | OUTPATIENT
Start: 2021-04-02 | End: 2021-09-29

## 2021-04-02 NOTE — PROGRESS NOTES
Assessment/Plan:Hypertensive cardiovascular disease with a blood pressure controlled on the current regimen  Hyperlipidemia on Welchol    Polyarthralgia takes Voltaren on a p r n  basis    Elevated fasting blood sugar will plan on checking a hemoglobin A1c and a fasting insulin level describes significant cravings for carbohydrate which may signal hyperinsulinemia    Morbid obesity with BMI of 38 26 diet and exercise discussed    Problem List Items Addressed This Visit     None      Visit Diagnoses     Hypothyroidism, unspecified type        Essential hypertension               Diagnoses and all orders for this visit:    Hypothyroidism, unspecified type  -     levothyroxine 25 mcg tablet; Take 1 tablet (25 mcg total) by mouth daily in the early morning    Essential hypertension  -     amLODIPine-benazepril (LOTREL) 10-20 MG per capsule; Take 1 capsule by mouth daily        No problem-specific Assessment & Plan notes found for this encounter  PHQ-9 Depression Screening    PHQ-9:   Frequency of the following problems over the past two weeks: Body mass index is 38 26 kg/m²  BMI Counseling: Body mass index is 38 26 kg/m²  The BMI is above normal  Nutrition recommendations include reducing portion sizes, decreasing overall calorie intake, 3-5 servings of fruits/vegetables daily, reducing fast food intake, consuming healthier snacks, decreasing soda and/or juice intake, moderation in carbohydrate intake, increasing intake of lean protein, reducing intake of saturated fat and trans fat and reducing intake of cholesterol  Exercise recommendations include moderate aerobic physical activity for 150 minutes/week and exercising 3-5 times per week  Subjective:      Patient ID: Solomon Mcelroy is a 63 y o  female       Patient presents for six-month checkup to go over hypertension      The following portions of the patient's history were reviewed and updated as appropriate:   She has no past medical history on file  ,  does not have a problem list on file  ,   has a past surgical history that includes Total vaginal hysterectomy (2003)  ,  family history is not on file  ,   reports that she has been smoking  She has been smoking about 0 75 packs per day  She has never used smokeless tobacco  She reports that she does not drink alcohol or use drugs  ,  has No Known Allergies     Current Outpatient Medications   Medication Sig Dispense Refill    amLODIPine-benazepril (LOTREL) 10-20 MG per capsule TAKE 1 CAPSULE DAILY 90 capsule 3    diclofenac (VOLTAREN) 75 mg EC tablet Take 1 tablet (75 mg total) by mouth 2 (two) times a day 60 tablet 2    hydrochlorothiazide (HYDRODIURIL) 50 mg tablet TAKE 1 TABLET DAILY 90 tablet 3    levothyroxine 25 mcg tablet Take 1 tablet (25 mcg total) by mouth daily in the early morning 90 tablet 3    meclizine (ANTIVERT) 25 mg tablet Take 25 mg by mouth Three times daily as needed      Welchol 625 MG tablet TAKE 2 TABLETS THREE TIMES A DAY WITH MEALS 540 tablet 3     No current facility-administered medications for this visit  Review of Systems   Constitutional: Negative for chills and fever  HENT: Negative for ear pain and sore throat  Eyes: Negative for pain and visual disturbance  Respiratory: Negative for cough and shortness of breath  Cardiovascular: Negative for chest pain and palpitations  Gastrointestinal: Negative for abdominal pain and vomiting  Genitourinary: Negative for dysuria and hematuria  Musculoskeletal: Negative for arthralgias and back pain  Skin: Negative for color change and rash  Neurological: Negative for seizures and syncope  All other systems reviewed and are negative  Objective:    /76   Pulse 84   Temp 97 5 °F (36 4 °C)   Resp 16   Ht 5' 3" (1 6 m)   Wt 98 kg (216 lb)   BMI 38 26 kg/m²   Body mass index is 38 26 kg/m²  Physical Exam  Constitutional:       Appearance: She is well-developed  She is obese  HENT:      Head: Normocephalic  Eyes:      Pupils: Pupils are equal, round, and reactive to light  Neck:      Musculoskeletal: Normal range of motion  Cardiovascular:      Rate and Rhythm: Normal rate and regular rhythm  Heart sounds: Normal heart sounds  Pulmonary:      Effort: Pulmonary effort is normal       Breath sounds: Normal breath sounds  Abdominal:      General: Bowel sounds are normal       Palpations: Abdomen is soft  Tenderness: There is no abdominal tenderness  Skin:     General: Skin is warm  Neurological:      Mental Status: She is alert and oriented to person, place, and time

## 2021-05-27 ENCOUNTER — IMMUNIZATIONS (OUTPATIENT)
Dept: FAMILY MEDICINE CLINIC | Facility: HOSPITAL | Age: 66
End: 2021-05-27

## 2021-05-27 DIAGNOSIS — Z23 ENCOUNTER FOR IMMUNIZATION: Primary | ICD-10-CM

## 2021-05-27 PROCEDURE — 91301 SARS-COV-2 / COVID-19 MRNA VACCINE (MODERNA) 100 MCG: CPT

## 2021-05-27 PROCEDURE — 0011A SARS-COV-2 / COVID-19 MRNA VACCINE (MODERNA) 100 MCG: CPT

## 2021-06-24 ENCOUNTER — IMMUNIZATIONS (OUTPATIENT)
Dept: FAMILY MEDICINE CLINIC | Facility: HOSPITAL | Age: 66
End: 2021-06-24

## 2021-06-24 DIAGNOSIS — Z23 ENCOUNTER FOR IMMUNIZATION: Primary | ICD-10-CM

## 2021-06-24 PROCEDURE — 0012A SARS-COV-2 / COVID-19 MRNA VACCINE (MODERNA) 100 MCG: CPT

## 2021-06-24 PROCEDURE — 91301 SARS-COV-2 / COVID-19 MRNA VACCINE (MODERNA) 100 MCG: CPT

## 2021-07-19 ENCOUNTER — TELEPHONE (OUTPATIENT)
Dept: ADMINISTRATIVE | Facility: OTHER | Age: 66
End: 2021-07-19

## 2021-07-19 NOTE — TELEPHONE ENCOUNTER
----- Message from Ginny Randall sent at 7/19/2021  8:34 AM EDT -----  Regarding: Care Gaps Request  07/19/21 8:34 AM    Hello, our patient Marielena Hernandez has had Mammogram completed/performed  Please assist in updating the patient chart by pulling the Care Everywhere (CE) document  The date of service is 07/14/2021       Thank you,  Ginny Randall  PG 2 [facility]

## 2021-07-19 NOTE — TELEPHONE ENCOUNTER
Upon review of the In Basket request we were able to locate, review, and update the patient chart as requested for Mammogram     Any additional questions or concerns should be emailed to the Practice Liaisons via [email or web address] email, please do not reply via In Basket      Thank you  Alicia Starr

## 2021-08-11 ENCOUNTER — VBI (OUTPATIENT)
Dept: ADMINISTRATIVE | Facility: OTHER | Age: 66
End: 2021-08-11

## 2021-09-29 ENCOUNTER — RA CDI HCC (OUTPATIENT)
Dept: OTHER | Facility: HOSPITAL | Age: 66
End: 2021-09-29

## 2021-09-29 DIAGNOSIS — I10 ESSENTIAL HYPERTENSION: ICD-10-CM

## 2021-09-29 RX ORDER — AMLODIPINE BESYLATE AND BENAZEPRIL HYDROCHLORIDE 10; 20 MG/1; MG/1
CAPSULE ORAL
Qty: 90 CAPSULE | Refills: 1 | Status: SHIPPED | OUTPATIENT
Start: 2021-09-29 | End: 2022-03-28

## 2021-09-29 NOTE — PROGRESS NOTES
[facility] 75  coding opportunities       Chart reviewed, no opportunity found: CHART REVIEWED, NO OPPORTUNITY FOUND                        Patients insurance company:  [employer] [facility] (Medicare Advantage and Commercial)

## 2021-10-01 ENCOUNTER — APPOINTMENT (OUTPATIENT)
Dept: LAB | Facility: HOSPITAL | Age: 66
End: 2021-10-01
Attending: FAMILY MEDICINE
Payer: COMMERCIAL

## 2021-10-01 DIAGNOSIS — E03.9 HYPOTHYROIDISM, UNSPECIFIED TYPE: ICD-10-CM

## 2021-10-01 DIAGNOSIS — E78.5 DYSLIPIDEMIA: ICD-10-CM

## 2021-10-01 DIAGNOSIS — I10 ESSENTIAL HYPERTENSION: ICD-10-CM

## 2021-10-01 DIAGNOSIS — R73.01 ELEVATED FASTING BLOOD SUGAR: ICD-10-CM

## 2021-10-01 LAB
ALBUMIN SERPL BCP-MCNC: 3.6 G/DL (ref 3.5–5)
ALP SERPL-CCNC: 81 U/L (ref 46–116)
ALT SERPL W P-5'-P-CCNC: 27 U/L (ref 12–78)
ANION GAP SERPL CALCULATED.3IONS-SCNC: 5 MMOL/L (ref 4–13)
AST SERPL W P-5'-P-CCNC: 17 U/L (ref 5–45)
BASOPHILS # BLD AUTO: 0.07 THOUSANDS/ΜL (ref 0–0.1)
BASOPHILS NFR BLD AUTO: 1 % (ref 0–1)
BILIRUB SERPL-MCNC: 0.45 MG/DL (ref 0.2–1)
BUN SERPL-MCNC: 14 MG/DL (ref 5–25)
CALCIUM SERPL-MCNC: 9.3 MG/DL (ref 8.3–10.1)
CHLORIDE SERPL-SCNC: 102 MMOL/L (ref 100–108)
CHOLEST SERPL-MCNC: 210 MG/DL (ref 50–200)
CO2 SERPL-SCNC: 32 MMOL/L (ref 21–32)
CREAT SERPL-MCNC: 0.84 MG/DL (ref 0.6–1.3)
EOSINOPHIL # BLD AUTO: 0.52 THOUSAND/ΜL (ref 0–0.61)
EOSINOPHIL NFR BLD AUTO: 5 % (ref 0–6)
ERYTHROCYTE [DISTWIDTH] IN BLOOD BY AUTOMATED COUNT: 13.9 % (ref 11.6–15.1)
EST. AVERAGE GLUCOSE BLD GHB EST-MCNC: 117 MG/DL
GFR SERPL CREATININE-BSD FRML MDRD: 74 ML/MIN/1.73SQ M
GLUCOSE P FAST SERPL-MCNC: 104 MG/DL (ref 65–99)
HBA1C MFR BLD: 5.7 %
HCT VFR BLD AUTO: 43.7 % (ref 34.8–46.1)
HDLC SERPL-MCNC: 68 MG/DL
HGB BLD-MCNC: 14.3 G/DL (ref 11.5–15.4)
IMM GRANULOCYTES # BLD AUTO: 0.03 THOUSAND/UL (ref 0–0.2)
IMM GRANULOCYTES NFR BLD AUTO: 0 % (ref 0–2)
INSULIN SERPL-ACNC: 11.6 MU/L (ref 3–25)
LDLC SERPL CALC-MCNC: 111 MG/DL (ref 0–100)
LYMPHOCYTES # BLD AUTO: 4.86 THOUSANDS/ΜL (ref 0.6–4.47)
LYMPHOCYTES NFR BLD AUTO: 43 % (ref 14–44)
MCH RBC QN AUTO: 29.2 PG (ref 26.8–34.3)
MCHC RBC AUTO-ENTMCNC: 32.7 G/DL (ref 31.4–37.4)
MCV RBC AUTO: 89 FL (ref 82–98)
MONOCYTES # BLD AUTO: 0.79 THOUSAND/ΜL (ref 0.17–1.22)
MONOCYTES NFR BLD AUTO: 7 % (ref 4–12)
NEUTROPHILS # BLD AUTO: 5.1 THOUSANDS/ΜL (ref 1.85–7.62)
NEUTS SEG NFR BLD AUTO: 44 % (ref 43–75)
NRBC BLD AUTO-RTO: 0 /100 WBCS
PLATELET # BLD AUTO: 361 THOUSANDS/UL (ref 149–390)
PMV BLD AUTO: 9.1 FL (ref 8.9–12.7)
POTASSIUM SERPL-SCNC: 3.1 MMOL/L (ref 3.5–5.3)
PROT SERPL-MCNC: 7.6 G/DL (ref 6.4–8.2)
RBC # BLD AUTO: 4.89 MILLION/UL (ref 3.81–5.12)
SODIUM SERPL-SCNC: 139 MMOL/L (ref 136–145)
TRIGL SERPL-MCNC: 156 MG/DL
TSH SERPL DL<=0.05 MIU/L-ACNC: 5.01 UIU/ML (ref 0.36–3.74)
WBC # BLD AUTO: 11.37 THOUSAND/UL (ref 4.31–10.16)

## 2021-10-01 PROCEDURE — 80053 COMPREHEN METABOLIC PANEL: CPT

## 2021-10-01 PROCEDURE — 83036 HEMOGLOBIN GLYCOSYLATED A1C: CPT

## 2021-10-01 PROCEDURE — 83525 ASSAY OF INSULIN: CPT

## 2021-10-01 PROCEDURE — 85025 COMPLETE CBC W/AUTO DIFF WBC: CPT

## 2021-10-01 PROCEDURE — 80061 LIPID PANEL: CPT

## 2021-10-01 PROCEDURE — 84443 ASSAY THYROID STIM HORMONE: CPT

## 2021-10-01 PROCEDURE — 36415 COLL VENOUS BLD VENIPUNCTURE: CPT

## 2021-10-08 ENCOUNTER — OFFICE VISIT (OUTPATIENT)
Dept: FAMILY MEDICINE CLINIC | Facility: CLINIC | Age: 66
End: 2021-10-08
Payer: COMMERCIAL

## 2021-10-08 VITALS
BODY MASS INDEX: 37.74 KG/M2 | DIASTOLIC BLOOD PRESSURE: 80 MMHG | HEART RATE: 84 BPM | RESPIRATION RATE: 20 BRPM | SYSTOLIC BLOOD PRESSURE: 134 MMHG | TEMPERATURE: 97.3 F | WEIGHT: 213 LBS | HEIGHT: 63 IN

## 2021-10-08 DIAGNOSIS — Z11.59 NEED FOR HEPATITIS C SCREENING TEST: ICD-10-CM

## 2021-10-08 DIAGNOSIS — Z11.3 SCREENING FOR STDS (SEXUALLY TRANSMITTED DISEASES): ICD-10-CM

## 2021-10-08 DIAGNOSIS — E55.9 VITAMIN D DEFICIENCY: ICD-10-CM

## 2021-10-08 DIAGNOSIS — R73.01 ELEVATED FASTING BLOOD SUGAR: ICD-10-CM

## 2021-10-08 DIAGNOSIS — Z12.2 ENCOUNTER FOR SCREENING FOR LUNG CANCER: ICD-10-CM

## 2021-10-08 DIAGNOSIS — Z00.00 ANNUAL PHYSICAL EXAM: Primary | ICD-10-CM

## 2021-10-08 DIAGNOSIS — Z11.4 SCREENING FOR HIV (HUMAN IMMUNODEFICIENCY VIRUS): ICD-10-CM

## 2021-10-08 DIAGNOSIS — E03.8 OTHER SPECIFIED HYPOTHYROIDISM: ICD-10-CM

## 2021-10-08 DIAGNOSIS — C05.1 MALIGNANT NEOPLASM OF SOFT PALATE (HCC): ICD-10-CM

## 2021-10-08 DIAGNOSIS — E66.01 CLASS 2 SEVERE OBESITY WITH SERIOUS COMORBIDITY AND BODY MASS INDEX (BMI) OF 37.0 TO 37.9 IN ADULT, UNSPECIFIED OBESITY TYPE (HCC): ICD-10-CM

## 2021-10-08 DIAGNOSIS — R73.03 PRE-DIABETES: ICD-10-CM

## 2021-10-08 DIAGNOSIS — F17.200 TOBACCO USE DISORDER: ICD-10-CM

## 2021-10-08 PROCEDURE — 99396 PREV VISIT EST AGE 40-64: CPT | Performed by: FAMILY MEDICINE

## 2021-10-08 PROCEDURE — 3008F BODY MASS INDEX DOCD: CPT | Performed by: FAMILY MEDICINE

## 2021-10-08 PROCEDURE — 99213 OFFICE O/P EST LOW 20 MIN: CPT | Performed by: FAMILY MEDICINE

## 2021-10-08 PROCEDURE — 4004F PT TOBACCO SCREEN RCVD TLK: CPT | Performed by: FAMILY MEDICINE

## 2021-10-08 PROCEDURE — 3725F SCREEN DEPRESSION PERFORMED: CPT | Performed by: FAMILY MEDICINE

## 2021-10-15 ENCOUNTER — HOSPITAL ENCOUNTER (OUTPATIENT)
Dept: CT IMAGING | Facility: HOSPITAL | Age: 66
Discharge: HOME | End: 2021-10-15
Attending: FAMILY MEDICINE
Payer: COMMERCIAL

## 2021-10-15 DIAGNOSIS — Z12.2 ENCOUNTER FOR SCREENING FOR LUNG CANCER: ICD-10-CM

## 2021-10-15 DIAGNOSIS — Z00.00 ANNUAL PHYSICAL EXAM: ICD-10-CM

## 2021-10-15 PROCEDURE — 71271 CT THORAX LUNG CANCER SCR C-: CPT

## 2021-10-21 ENCOUNTER — TELEPHONE (OUTPATIENT)
Dept: FAMILY MEDICINE CLINIC | Facility: CLINIC | Age: 66
End: 2021-10-21

## 2022-01-10 DIAGNOSIS — R53.83 FATIGUE, UNSPECIFIED TYPE: ICD-10-CM

## 2022-01-10 DIAGNOSIS — R50.9 FEVER IN ADULT: Primary | ICD-10-CM

## 2022-01-10 DIAGNOSIS — J02.9 SORE THROAT: ICD-10-CM

## 2022-01-11 PROCEDURE — U0005 INFEC AGEN DETEC AMPLI PROBE: HCPCS | Performed by: FAMILY MEDICINE

## 2022-01-11 PROCEDURE — U0003 INFECTIOUS AGENT DETECTION BY NUCLEIC ACID (DNA OR RNA); SEVERE ACUTE RESPIRATORY SYNDROME CORONAVIRUS 2 (SARS-COV-2) (CORONAVIRUS DISEASE [COVID-19]), AMPLIFIED PROBE TECHNIQUE, MAKING USE OF HIGH THROUGHPUT TECHNOLOGIES AS DESCRIBED BY CMS-2020-01-R: HCPCS | Performed by: FAMILY MEDICINE

## 2022-01-13 ENCOUNTER — TELEPHONE (OUTPATIENT)
Dept: FAMILY MEDICINE CLINIC | Facility: CLINIC | Age: 67
End: 2022-01-13

## 2022-01-13 DIAGNOSIS — J01.01 ACUTE RECURRENT MAXILLARY SINUSITIS: Primary | ICD-10-CM

## 2022-01-13 RX ORDER — AZITHROMYCIN 250 MG/1
TABLET, FILM COATED ORAL
Qty: 6 TABLET | Refills: 0 | Status: SHIPPED | OUTPATIENT
Start: 2022-01-13 | End: 2022-01-18

## 2022-01-13 RX ORDER — METHYLPREDNISOLONE 4 MG/1
TABLET ORAL
Qty: 21 EACH | Refills: 0 | Status: SHIPPED | OUTPATIENT
Start: 2022-01-13 | End: 2022-01-13

## 2022-01-13 RX ORDER — METHYLPREDNISOLONE 4 MG/1
TABLET ORAL
Qty: 21 EACH | Refills: 0 | Status: SHIPPED | OUTPATIENT
Start: 2022-01-13 | End: 2022-04-08 | Stop reason: ALTCHOICE

## 2022-01-13 RX ORDER — AZITHROMYCIN 250 MG/1
TABLET, FILM COATED ORAL
Qty: 6 TABLET | Refills: 0 | Status: SHIPPED | OUTPATIENT
Start: 2022-01-13 | End: 2022-01-13

## 2022-01-13 NOTE — TELEPHONE ENCOUNTER
Patient is aware of covid +, symptoms started Saturday, aware of 10 day quarantine, low grade fever on and off taking tylenol, sinusitis/headache, no chest congestion, no n/v/d, no loss taste or smell, pushing fluids    would you like to prescribe anything for the sinuses to [facility]?

## 2022-02-08 ENCOUNTER — IMMUNIZATIONS (OUTPATIENT)
Dept: FAMILY MEDICINE CLINIC | Facility: HOSPITAL | Age: 67
End: 2022-02-08

## 2022-02-08 DIAGNOSIS — Z23 ENCOUNTER FOR IMMUNIZATION: Primary | ICD-10-CM

## 2022-02-08 PROCEDURE — 91306 COVID-19 MODERNA VACC 0.25 ML BOOSTER: CPT

## 2022-02-08 PROCEDURE — 0064A COVID-19 MODERNA VACC 0.25 ML BOOSTER: CPT

## 2022-03-29 ENCOUNTER — APPOINTMENT (OUTPATIENT)
Dept: LAB | Facility: HOSPITAL | Age: 67
End: 2022-03-29
Attending: FAMILY MEDICINE
Payer: COMMERCIAL

## 2022-03-29 DIAGNOSIS — Z00.00 ANNUAL PHYSICAL EXAM: ICD-10-CM

## 2022-03-29 DIAGNOSIS — E03.8 OTHER SPECIFIED HYPOTHYROIDISM: ICD-10-CM

## 2022-03-29 DIAGNOSIS — E55.9 VITAMIN D DEFICIENCY: ICD-10-CM

## 2022-03-29 DIAGNOSIS — Z11.4 SCREENING FOR HIV (HUMAN IMMUNODEFICIENCY VIRUS): ICD-10-CM

## 2022-03-29 DIAGNOSIS — Z11.59 NEED FOR HEPATITIS C SCREENING TEST: ICD-10-CM

## 2022-03-29 DIAGNOSIS — Z11.3 SCREENING FOR STDS (SEXUALLY TRANSMITTED DISEASES): ICD-10-CM

## 2022-03-29 DIAGNOSIS — R73.03 PRE-DIABETES: ICD-10-CM

## 2022-03-29 LAB
25(OH)D3 SERPL-MCNC: 48 NG/ML (ref 30–100)
ALBUMIN SERPL BCP-MCNC: 3.6 G/DL (ref 3.5–5)
ALP SERPL-CCNC: 77 U/L (ref 46–116)
ALT SERPL W P-5'-P-CCNC: 25 U/L (ref 12–78)
ANION GAP SERPL CALCULATED.3IONS-SCNC: 9 MMOL/L (ref 4–13)
AST SERPL W P-5'-P-CCNC: 17 U/L (ref 5–45)
BILIRUB SERPL-MCNC: 0.4 MG/DL (ref 0.2–1)
BUN SERPL-MCNC: 14 MG/DL (ref 5–25)
CALCIUM SERPL-MCNC: 9.2 MG/DL (ref 8.3–10.1)
CHLORIDE SERPL-SCNC: 102 MMOL/L (ref 100–108)
CO2 SERPL-SCNC: 30 MMOL/L (ref 21–32)
CREAT SERPL-MCNC: 0.78 MG/DL (ref 0.6–1.3)
ERYTHROCYTE [DISTWIDTH] IN BLOOD BY AUTOMATED COUNT: 14.4 % (ref 11.6–15.1)
EST. AVERAGE GLUCOSE BLD GHB EST-MCNC: 120 MG/DL
GFR SERPL CREATININE-BSD FRML MDRD: 80 ML/MIN/1.73SQ M
GLUCOSE P FAST SERPL-MCNC: 99 MG/DL (ref 65–99)
HBA1C MFR BLD: 5.8 %
HBV CORE AB SER QL: NORMAL
HBV SURFACE AB SER-ACNC: 6.3 MIU/ML
HBV SURFACE AG SER QL: NORMAL
HCT VFR BLD AUTO: 44.9 % (ref 34.8–46.1)
HCV AB SER QL: NORMAL
HGB BLD-MCNC: 14.6 G/DL (ref 11.5–15.4)
MCH RBC QN AUTO: 28.9 PG (ref 26.8–34.3)
MCHC RBC AUTO-ENTMCNC: 32.5 G/DL (ref 31.4–37.4)
MCV RBC AUTO: 89 FL (ref 82–98)
PLATELET # BLD AUTO: 370 THOUSANDS/UL (ref 149–390)
PMV BLD AUTO: 9.6 FL (ref 8.9–12.7)
POTASSIUM SERPL-SCNC: 3.4 MMOL/L (ref 3.5–5.3)
PROT SERPL-MCNC: 7.7 G/DL (ref 6.4–8.2)
RBC # BLD AUTO: 5.06 MILLION/UL (ref 3.81–5.12)
SODIUM SERPL-SCNC: 141 MMOL/L (ref 136–145)
TSH SERPL DL<=0.05 MIU/L-ACNC: 5.48 UIU/ML (ref 0.36–3.74)
WBC # BLD AUTO: 10.68 THOUSAND/UL (ref 4.31–10.16)

## 2022-03-29 PROCEDURE — 83036 HEMOGLOBIN GLYCOSYLATED A1C: CPT

## 2022-03-29 PROCEDURE — 86706 HEP B SURFACE ANTIBODY: CPT

## 2022-03-29 PROCEDURE — 80053 COMPREHEN METABOLIC PANEL: CPT

## 2022-03-29 PROCEDURE — 85027 COMPLETE CBC AUTOMATED: CPT

## 2022-03-29 PROCEDURE — 86592 SYPHILIS TEST NON-TREP QUAL: CPT

## 2022-03-29 PROCEDURE — 87389 HIV-1 AG W/HIV-1&-2 AB AG IA: CPT

## 2022-03-29 PROCEDURE — 84443 ASSAY THYROID STIM HORMONE: CPT

## 2022-03-29 PROCEDURE — 87340 HEPATITIS B SURFACE AG IA: CPT

## 2022-03-29 PROCEDURE — 86803 HEPATITIS C AB TEST: CPT

## 2022-03-29 PROCEDURE — 82306 VITAMIN D 25 HYDROXY: CPT

## 2022-03-29 PROCEDURE — 36415 COLL VENOUS BLD VENIPUNCTURE: CPT

## 2022-03-29 PROCEDURE — 86704 HEP B CORE ANTIBODY TOTAL: CPT

## 2022-03-30 LAB
HIV 1+2 AB+HIV1 P24 AG SERPL QL IA: NORMAL
RPR SER QL: NORMAL

## 2022-04-01 ENCOUNTER — RA CDI HCC (OUTPATIENT)
Dept: OTHER | Facility: HOSPITAL | Age: 67
End: 2022-04-01

## 2022-04-01 PROBLEM — E66.01 CLASS 2 SEVERE OBESITY DUE TO EXCESS CALORIES WITH SERIOUS COMORBIDITY AND BODY MASS INDEX (BMI) OF 37.0 TO 37.9 IN ADULT (HCC): Status: ACTIVE | Noted: 2022-04-01

## 2022-04-01 PROBLEM — E66.812 CLASS 2 SEVERE OBESITY DUE TO EXCESS CALORIES WITH SERIOUS COMORBIDITY AND BODY MASS INDEX (BMI) OF 37.0 TO 37.9 IN ADULT (HCC): Status: ACTIVE | Noted: 2022-04-01

## 2022-04-01 NOTE — PROGRESS NOTES
[facility] 75  coding opportunities          Chart Reviewed number of suggestions sent to Provider: 1   E66 01 Z68 37 Class 2 severe obesity with serious comorbidity and body mass index (BMI) of 37 0 to 37 9 in adult, unspecified obesity type ([facility] 75 )  *last assessed on 10/8/2021  Needs recertification      If this is correct, please document and assess at your next visit,4/8/2022    Patients Insurance        Commercial Insurance: [employer]

## 2022-04-08 ENCOUNTER — OFFICE VISIT (OUTPATIENT)
Dept: FAMILY MEDICINE CLINIC | Facility: CLINIC | Age: 67
End: 2022-04-08
Payer: COMMERCIAL

## 2022-04-08 ENCOUNTER — TELEPHONE (OUTPATIENT)
Dept: ADMINISTRATIVE | Facility: OTHER | Age: 67
End: 2022-04-08

## 2022-04-08 VITALS
HEIGHT: 63 IN | DIASTOLIC BLOOD PRESSURE: 68 MMHG | WEIGHT: 213 LBS | RESPIRATION RATE: 20 BRPM | SYSTOLIC BLOOD PRESSURE: 124 MMHG | TEMPERATURE: 96 F | BODY MASS INDEX: 37.74 KG/M2 | HEART RATE: 84 BPM

## 2022-04-08 DIAGNOSIS — F17.200 TOBACCO USE DISORDER: ICD-10-CM

## 2022-04-08 DIAGNOSIS — E78.5 DYSLIPIDEMIA: ICD-10-CM

## 2022-04-08 DIAGNOSIS — R42 VERTIGO: ICD-10-CM

## 2022-04-08 DIAGNOSIS — M77.8 TENDINITIS OF LEFT SHOULDER: ICD-10-CM

## 2022-04-08 DIAGNOSIS — E66.01 CLASS 2 SEVERE OBESITY WITH SERIOUS COMORBIDITY AND BODY MASS INDEX (BMI) OF 37.0 TO 37.9 IN ADULT, UNSPECIFIED OBESITY TYPE (HCC): ICD-10-CM

## 2022-04-08 DIAGNOSIS — K63.5 POLYP OF COLON, UNSPECIFIED PART OF COLON, UNSPECIFIED TYPE: ICD-10-CM

## 2022-04-08 DIAGNOSIS — C05.1 MALIGNANT NEOPLASM OF SOFT PALATE (HCC): ICD-10-CM

## 2022-04-08 DIAGNOSIS — E55.9 VITAMIN D DEFICIENCY: ICD-10-CM

## 2022-04-08 DIAGNOSIS — E03.9 HYPOTHYROIDISM, UNSPECIFIED TYPE: Primary | ICD-10-CM

## 2022-04-08 DIAGNOSIS — F43.29 GRIEF REACTION WITH PROLONGED BEREAVEMENT: ICD-10-CM

## 2022-04-08 DIAGNOSIS — I10 ESSENTIAL HYPERTENSION: ICD-10-CM

## 2022-04-08 DIAGNOSIS — R73.03 PRE-DIABETES: ICD-10-CM

## 2022-04-08 PROCEDURE — 99214 OFFICE O/P EST MOD 30 MIN: CPT | Performed by: FAMILY MEDICINE

## 2022-04-08 PROCEDURE — 1101F PT FALLS ASSESS-DOCD LE1/YR: CPT | Performed by: FAMILY MEDICINE

## 2022-04-08 PROCEDURE — 3008F BODY MASS INDEX DOCD: CPT | Performed by: FAMILY MEDICINE

## 2022-04-08 PROCEDURE — 3288F FALL RISK ASSESSMENT DOCD: CPT | Performed by: FAMILY MEDICINE

## 2022-04-08 PROCEDURE — 3725F SCREEN DEPRESSION PERFORMED: CPT | Performed by: FAMILY MEDICINE

## 2022-04-08 PROCEDURE — 4004F PT TOBACCO SCREEN RCVD TLK: CPT | Performed by: FAMILY MEDICINE

## 2022-04-08 RX ORDER — BUPROPION HYDROCHLORIDE 150 MG/1
150 TABLET ORAL EVERY MORNING
Qty: 90 TABLET | Refills: 3 | Status: SHIPPED | OUTPATIENT
Start: 2022-04-08

## 2022-04-08 RX ORDER — DICLOFENAC SODIUM 75 MG/1
75 TABLET, DELAYED RELEASE ORAL 2 TIMES DAILY
Qty: 60 TABLET | Refills: 2 | Status: SHIPPED | OUTPATIENT
Start: 2022-04-08

## 2022-04-08 RX ORDER — LEVOTHYROXINE SODIUM 0.05 MG/1
50 TABLET ORAL
Qty: 90 TABLET | Refills: 3 | Status: SHIPPED | OUTPATIENT
Start: 2022-04-08

## 2022-04-08 RX ORDER — MECLIZINE HYDROCHLORIDE 25 MG/1
25 TABLET ORAL EVERY 8 HOURS SCHEDULED
Qty: 30 TABLET | Refills: 2 | Status: SHIPPED | OUTPATIENT
Start: 2022-04-08

## 2022-04-08 NOTE — TELEPHONE ENCOUNTER
----- Message from Tacos De Jesus sent at 4/8/2022  9:09 AM EDT -----  Regarding: Care Gaps Request  04/08/22 9:09 AM    Hello, our patient Chanelle Scott has had Mammogram completed/performed  Please assist in updating the patient chart by pulling the Care Everywhere (CE) document  The date of service is 07/19/2021       Thank you,  Tacos De Jesus  PG 2 [facility]

## 2022-04-08 NOTE — PROGRESS NOTES
Assessment/Plan:  Elevation of TSH will increase levothyroxine to 50 mcg per day    Hypertensive cardiovascular disease with blood pressure controlled on the current regimen    Hyperlipidemia on Welchol with cholesterol pattern acceptable with the HDL of 68 and LDL of 111 a total cholesterol of 210 with triglycerides slightly elevated at 156    Pre diabetes with a hemoglobin A1c of 5 8 diet has been discussed    Morbid obesity with BMI of 37 73 diet again has been discussed    Bereavement over the death of her son 4 years ago with depressive overtones and tobacco use disorder will begin a trial of Wellbutrin  daily side effects of Wellbutrin have been discussed  History of colon polyps is to have repeat colonoscopy in the near future 2019 colonoscopy reviewed  Tendinitis of left shoulder with recurring flares will have Voltaren 75 mg b i d  On hand    Episodic vertigo will have Antivert 12 5 1/2    History of malignant neoplasia of the soft palate in remission    Problem List Items Addressed This Visit     None      Visit Diagnoses     Tendinitis of left shoulder               Diagnoses and all orders for this visit:    Tendinitis of left shoulder        No problem-specific Assessment & Plan notes found for this encounter  PHQ-2/9 Depression Screening    Little interest or pleasure in doing things: 0 - not at all  Feeling down, depressed, or hopeless: 0 - not at all  PHQ-2 Score: 0  PHQ-2 Interpretation: Negative depression screen          Body mass index is 37 73 kg/m²  BMI Counseling: Body mass index is 37 73 kg/m²   The BMI is above normal  Nutrition recommendations include reducing portion sizes, decreasing overall calorie intake, 3-5 servings of fruits/vegetables daily, reducing fast food intake, consuming healthier snacks, decreasing soda and/or juice intake, moderation in carbohydrate intake, increasing intake of lean protein, reducing intake of saturated fat and trans fat and reducing intake of cholesterol  Subjective:      Patient ID: Dennie Glimpse is a 61 y o  female  PATIENT PRESENTS FOR SIX-MONTH CHECKUP ON HYPOTHYROIDISM HYPERTENSION AND BEREAVEMENT      The following portions of the patient's history were reviewed and updated as appropriate:   She has no past medical history on file  ,  does not have any pertinent problems on file  ,   has a past surgical history that includes Total vaginal hysterectomy (2003)  ,  family history is not on file  ,   reports that she has been smoking  She has been smoking about 0 75 packs per day  She has never used smokeless tobacco  She reports that she does not drink alcohol and does not use drugs  ,  has No Known Allergies     Current Outpatient Medications   Medication Sig Dispense Refill    amLODIPine-benazepril (LOTREL) 10-20 MG per capsule TAKE 1 CAPSULE DAILY 90 capsule 1    colesevelam (WELCHOL) 625 mg tablet TAKE 2 TABLETS THREE TIMES A DAY WITH MEALS 540 tablet 1    diclofenac (VOLTAREN) 75 mg EC tablet Take 1 tablet (75 mg total) by mouth 2 (two) times a day 60 tablet 2    hydrochlorothiazide (HYDRODIURIL) 50 mg tablet TAKE 1 TABLET DAILY 90 tablet 1    levothyroxine 25 mcg tablet TAKE 1 TABLET DAILY EARLY IN THE MORNING 90 tablet 1    meclizine (ANTIVERT) 25 mg tablet Take 25 mg by mouth Three times daily as needed       No current facility-administered medications for this visit  Review of Systems   Constitutional: Negative for chills and fever  HENT: Negative for ear pain and sore throat  Eyes: Negative for pain and visual disturbance  Respiratory: Negative for cough and shortness of breath  Cardiovascular: Negative for chest pain and palpitations  Gastrointestinal: Negative for abdominal pain and vomiting  Genitourinary: Negative for dysuria and hematuria  Musculoskeletal: Negative for arthralgias and back pain  Skin: Negative for color change and rash  Neurological: Negative for seizures and syncope  Psychiatric/Behavioral: Positive for dysphoric mood  PROLONGED BEREAVEMENT   All other systems reviewed and are negative  Objective:    /68   Pulse 84   Temp (!) 96 °F (35 6 °C)   Resp 20   Ht 5' 3" (1 6 m)   Wt 96 6 kg (213 lb)   BMI 37 73 kg/m²   Body mass index is 37 73 kg/m²  Physical Exam  Constitutional:       Appearance: She is well-developed  She is obese  HENT:      Head: Normocephalic  Eyes:      Pupils: Pupils are equal, round, and reactive to light  Cardiovascular:      Rate and Rhythm: Normal rate and regular rhythm  Heart sounds: Normal heart sounds  Pulmonary:      Effort: Pulmonary effort is normal       Breath sounds: Normal breath sounds  Abdominal:      General: Bowel sounds are normal       Palpations: Abdomen is soft  Tenderness: There is no abdominal tenderness  Musculoskeletal:      Cervical back: Normal range of motion  Skin:     General: Skin is warm  Neurological:      Mental Status: She is alert and oriented to person, place, and time

## 2022-04-08 NOTE — TELEPHONE ENCOUNTER
Upon review of the In Basket request we were able to locate, review, and update the patient chart as requested for Mammogram     Any additional questions or concerns should be emailed to the Practice Liaisons via [email or web address] email, please do not reply via In Basket      Thank you  Leyla Galvan

## 2022-07-29 ENCOUNTER — TELEPHONE (OUTPATIENT)
Dept: ADMINISTRATIVE | Facility: OTHER | Age: 67
End: 2022-07-29

## 2022-07-29 NOTE — TELEPHONE ENCOUNTER
Upon review of the In Basket request we were able to locate, review, and update the patient chart as requested for Mammogram     Any additional questions or concerns should be emailed to the Practice Liaisons via [email or web address] email, please do not reply via In Basket      Thank you  Lilia Muller

## 2022-07-29 NOTE — TELEPHONE ENCOUNTER
----- Message from [facility] sent at 7/28/2022 11:21 AM EDT -----  Regarding: Mammo  Patient had a mammogram through [facility] on 07/25/2022 - can this please be liked to our care gap in her chart     Thank you,  Man Patel

## 2022-09-26 DIAGNOSIS — I10 ESSENTIAL HYPERTENSION: ICD-10-CM

## 2022-09-26 RX ORDER — AMLODIPINE BESYLATE AND BENAZEPRIL HYDROCHLORIDE 10; 20 MG/1; MG/1
CAPSULE ORAL
Qty: 90 CAPSULE | Refills: 3 | Status: SHIPPED | OUTPATIENT
Start: 2022-09-26

## 2022-10-03 ENCOUNTER — APPOINTMENT (OUTPATIENT)
Dept: LAB | Facility: HOSPITAL | Age: 67
End: 2022-10-03
Attending: FAMILY MEDICINE
Payer: COMMERCIAL

## 2022-10-03 DIAGNOSIS — E78.5 DYSLIPIDEMIA: ICD-10-CM

## 2022-10-03 DIAGNOSIS — R73.03 PRE-DIABETES: ICD-10-CM

## 2022-10-03 DIAGNOSIS — E55.9 VITAMIN D DEFICIENCY: ICD-10-CM

## 2022-10-03 DIAGNOSIS — E03.9 HYPOTHYROIDISM, UNSPECIFIED TYPE: ICD-10-CM

## 2022-10-03 LAB
25(OH)D3 SERPL-MCNC: 56.2 NG/ML (ref 30–100)
ALBUMIN SERPL BCP-MCNC: 3.5 G/DL (ref 3.5–5)
ALP SERPL-CCNC: 83 U/L (ref 46–116)
ALT SERPL W P-5'-P-CCNC: 23 U/L (ref 12–78)
ANION GAP SERPL CALCULATED.3IONS-SCNC: 7 MMOL/L (ref 4–13)
AST SERPL W P-5'-P-CCNC: 17 U/L (ref 5–45)
BASOPHILS # BLD AUTO: 0.06 THOUSANDS/ΜL (ref 0–0.1)
BASOPHILS NFR BLD AUTO: 1 % (ref 0–1)
BILIRUB SERPL-MCNC: 0.42 MG/DL (ref 0.2–1)
BUN SERPL-MCNC: 14 MG/DL (ref 5–25)
CALCIUM SERPL-MCNC: 9.5 MG/DL (ref 8.3–10.1)
CHLORIDE SERPL-SCNC: 98 MMOL/L (ref 96–108)
CHOLEST SERPL-MCNC: 215 MG/DL
CO2 SERPL-SCNC: 32 MMOL/L (ref 21–32)
CREAT SERPL-MCNC: 0.82 MG/DL (ref 0.6–1.3)
EOSINOPHIL # BLD AUTO: 0.33 THOUSAND/ΜL (ref 0–0.61)
EOSINOPHIL NFR BLD AUTO: 4 % (ref 0–6)
ERYTHROCYTE [DISTWIDTH] IN BLOOD BY AUTOMATED COUNT: 13.6 % (ref 11.6–15.1)
EST. AVERAGE GLUCOSE BLD GHB EST-MCNC: 120 MG/DL
GFR SERPL CREATININE-BSD FRML MDRD: 75 ML/MIN/1.73SQ M
GLUCOSE P FAST SERPL-MCNC: 97 MG/DL (ref 65–99)
HBA1C MFR BLD: 5.8 %
HCT VFR BLD AUTO: 43.9 % (ref 34.8–46.1)
HDLC SERPL-MCNC: 73 MG/DL
HGB BLD-MCNC: 14.5 G/DL (ref 11.5–15.4)
IMM GRANULOCYTES # BLD AUTO: 0.02 THOUSAND/UL (ref 0–0.2)
IMM GRANULOCYTES NFR BLD AUTO: 0 % (ref 0–2)
LDLC SERPL CALC-MCNC: 118 MG/DL (ref 0–100)
LYMPHOCYTES # BLD AUTO: 2.98 THOUSANDS/ΜL (ref 0.6–4.47)
LYMPHOCYTES NFR BLD AUTO: 33 % (ref 14–44)
MCH RBC QN AUTO: 29.4 PG (ref 26.8–34.3)
MCHC RBC AUTO-ENTMCNC: 33 G/DL (ref 31.4–37.4)
MCV RBC AUTO: 89 FL (ref 82–98)
MONOCYTES # BLD AUTO: 0.66 THOUSAND/ΜL (ref 0.17–1.22)
MONOCYTES NFR BLD AUTO: 7 % (ref 4–12)
NEUTROPHILS # BLD AUTO: 4.87 THOUSANDS/ΜL (ref 1.85–7.62)
NEUTS SEG NFR BLD AUTO: 55 % (ref 43–75)
NRBC BLD AUTO-RTO: 0 /100 WBCS
PLATELET # BLD AUTO: 354 THOUSANDS/UL (ref 149–390)
PMV BLD AUTO: 9.5 FL (ref 8.9–12.7)
POTASSIUM SERPL-SCNC: 3.5 MMOL/L (ref 3.5–5.3)
PROT SERPL-MCNC: 7.5 G/DL (ref 6.4–8.4)
RBC # BLD AUTO: 4.93 MILLION/UL (ref 3.81–5.12)
SODIUM SERPL-SCNC: 137 MMOL/L (ref 135–147)
TRIGL SERPL-MCNC: 119 MG/DL
TSH SERPL DL<=0.05 MIU/L-ACNC: 1.82 UIU/ML (ref 0.45–4.5)
WBC # BLD AUTO: 8.92 THOUSAND/UL (ref 4.31–10.16)

## 2022-10-03 PROCEDURE — 83036 HEMOGLOBIN GLYCOSYLATED A1C: CPT

## 2022-10-03 PROCEDURE — 80061 LIPID PANEL: CPT

## 2022-10-03 PROCEDURE — 84443 ASSAY THYROID STIM HORMONE: CPT

## 2022-10-03 PROCEDURE — 82306 VITAMIN D 25 HYDROXY: CPT

## 2022-10-03 PROCEDURE — 80053 COMPREHEN METABOLIC PANEL: CPT

## 2022-10-03 PROCEDURE — 36415 COLL VENOUS BLD VENIPUNCTURE: CPT

## 2022-10-03 PROCEDURE — 85025 COMPLETE CBC W/AUTO DIFF WBC: CPT

## 2022-10-11 ENCOUNTER — OFFICE VISIT (OUTPATIENT)
Dept: FAMILY MEDICINE CLINIC | Facility: CLINIC | Age: 67
End: 2022-10-11
Payer: COMMERCIAL

## 2022-10-11 ENCOUNTER — TELEPHONE (OUTPATIENT)
Dept: FAMILY MEDICINE CLINIC | Facility: CLINIC | Age: 67
End: 2022-10-11

## 2022-10-11 ENCOUNTER — TELEPHONE (OUTPATIENT)
Dept: ADMINISTRATIVE | Facility: OTHER | Age: 67
End: 2022-10-11

## 2022-10-11 VITALS
RESPIRATION RATE: 16 BRPM | HEIGHT: 63 IN | DIASTOLIC BLOOD PRESSURE: 80 MMHG | WEIGHT: 208 LBS | TEMPERATURE: 96.3 F | BODY MASS INDEX: 36.86 KG/M2 | HEART RATE: 80 BPM | SYSTOLIC BLOOD PRESSURE: 126 MMHG

## 2022-10-11 DIAGNOSIS — Z12.2 ENCOUNTER FOR SCREENING FOR LUNG CANCER: Primary | ICD-10-CM

## 2022-10-11 DIAGNOSIS — R73.03 PRE-DIABETES: ICD-10-CM

## 2022-10-11 DIAGNOSIS — E55.9 VITAMIN D DEFICIENCY: ICD-10-CM

## 2022-10-11 DIAGNOSIS — R25.2 LEG CRAMPS: ICD-10-CM

## 2022-10-11 DIAGNOSIS — E03.9 HYPOTHYROIDISM, UNSPECIFIED TYPE: ICD-10-CM

## 2022-10-11 DIAGNOSIS — I10 ESSENTIAL HYPERTENSION: ICD-10-CM

## 2022-10-11 DIAGNOSIS — E78.5 DYSLIPIDEMIA: ICD-10-CM

## 2022-10-11 DIAGNOSIS — N18.2 STAGE 2 CHRONIC KIDNEY DISEASE: ICD-10-CM

## 2022-10-11 DIAGNOSIS — F17.200 TOBACCO USE DISORDER: ICD-10-CM

## 2022-10-11 DIAGNOSIS — Z86.010 HISTORY OF COLON POLYPS: ICD-10-CM

## 2022-10-11 DIAGNOSIS — E03.9 ACQUIRED HYPOTHYROIDISM: ICD-10-CM

## 2022-10-11 DIAGNOSIS — E66.01 CLASS 2 SEVERE OBESITY WITH SERIOUS COMORBIDITY AND BODY MASS INDEX (BMI) OF 37.0 TO 37.9 IN ADULT, UNSPECIFIED OBESITY TYPE (HCC): ICD-10-CM

## 2022-10-11 DIAGNOSIS — Z00.00 ROUTINE GENERAL MEDICAL EXAMINATION AT A HEALTH CARE FACILITY: Primary | ICD-10-CM

## 2022-10-11 PROCEDURE — 99396 PREV VISIT EST AGE 40-64: CPT | Performed by: FAMILY MEDICINE

## 2022-10-11 PROCEDURE — 99214 OFFICE O/P EST MOD 30 MIN: CPT | Performed by: FAMILY MEDICINE

## 2022-10-11 RX ORDER — MAGNESIUM L-LACTATE 84 MG
84 TABLET, EXTENDED RELEASE ORAL DAILY
Qty: 90 TABLET | Refills: 1 | Status: SHIPPED | OUTPATIENT
Start: 2022-10-11

## 2022-10-11 RX ORDER — NICOTINE 21 MG/24HR
1 PATCH, TRANSDERMAL 24 HOURS TRANSDERMAL EVERY 24 HOURS
Qty: 28 PATCH | Refills: 5 | Status: SHIPPED | OUTPATIENT
Start: 2022-10-11

## 2022-10-11 RX ORDER — LEVOTHYROXINE SODIUM 0.05 MG/1
50 TABLET ORAL
Qty: 90 TABLET | Refills: 1 | Status: SHIPPED | OUTPATIENT
Start: 2022-10-11

## 2022-10-11 NOTE — LETTER
Procedure Request Form: Colonoscopy   Second Request      Date Requested: 10/18/22  Patient: Syl Mode  Patient : 1955   Referring Provider: Gladys Ventura, DO        Date of Procedure ______________________________       The above patient has informed us that they have completed their   most recent Colonoscopy at your facility  Please complete   this form and attach all corresponding procedure reports/results  Comments __________________________________________________________  ____________________________________________________________________  ____________________________________________________________________  ____________________________________________________________________    Facility Completing Procedure _________________________________________    Form Completed By (print name) _______________________________________      Signature __________________________________________________________      These reports are needed for  compliance  Please fax this completed form and a copy of the procedure report to our office located at [place] [address] as soon as possible to [phone] attention Cheryl Perla: Phone [phone]    We thank you for your assistance in treating our mutual patient

## 2022-10-11 NOTE — TELEPHONE ENCOUNTER
----- Message from Darrell Guzmán sent at 10/11/2022  9:05 AM EDT -----  Regarding: Care Gaps Request  10/11/22 9:05 AM    Hello, our patient Inocente Stafford has had CRC: Colonoscopy completed/performed  Please assist in updating the patient chart by pulling the Care Everywhere (CE) document  The date of service is 06/08/2022  [facility] Provider; results are listed as a surgical pathology under labs       Thank you,  Darrell Guzmán  PG 2 [facility]

## 2022-10-11 NOTE — TELEPHONE ENCOUNTER
Upon review of the In Basket request and the patient's chart, initial outreach has been made via fax, please see Contacts section for details       Thank you  Dasha Armenta

## 2022-10-11 NOTE — PROGRESS NOTES
Assessment and Plan:     Problem List Items Addressed This Visit    None     Visit Diagnoses     Hypothyroidism, unspecified type               Preventive health issues were discussed with patient, and age appropriate screening tests were ordered as noted in patient's After Visit Summary  Personalized health advice and appropriate referrals for health education or preventive services given if needed, as noted in patient's After Visit Summary  History of Present Illness:     Patient presents for a Medicare Wellness Visit    HPI   Patient Care Team:  Ginette Crockett DO as PCP - General (Family Medicine)     Review of Systems:     Review of Systems     Problem List:     Patient Active Problem List   Diagnosis   • Malignant neoplasm of soft palate ([address] )   • Class 2 severe obesity due to excess calories with serious comorbidity and body mass index (BMI) of 37 0 to 37 9 in adult [facility])      Past Medical and Surgical History:     No past medical history on file  Past Surgical History:   Procedure Laterality Date   • TOTAL VAGINAL HYSTERECTOMY  2003      Family History:     No family history on file  Social History:     Social History     Socioeconomic History   • Marital status:       Spouse name: None   • Number of children: None   • Years of education: None   • Highest education level: None   Occupational History   • None   Tobacco Use   • Smoking status: Current Every Day Smoker     Packs/day: 0 75   • Smokeless tobacco: Never Used   Vaping Use   • Vaping Use: Never used   Substance and Sexual Activity   • Alcohol use: Never   • Drug use: Never   • Sexual activity: None   Other Topics Concern   • None   Social History Narrative   • None     Social Determinants of Health     Financial Resource Strain: Not on file   Food Insecurity: Not on file   Transportation Needs: Not on file   Physical Activity: Not on file   Stress: Not on file   Social Connections: Not on file   Intimate Partner Violence: Not on file   Housing Stability: Not on file      Medications and Allergies:     Current Outpatient Medications   Medication Sig Dispense Refill   • amLODIPine-benazepril (LOTREL) 10-20 MG per capsule TAKE 1 CAPSULE DAILY 90 capsule 3   • buPROPion (Wellbutrin XL) 150 mg 24 hr tablet Take 1 tablet (150 mg total) by mouth every morning (Patient not taking: Reported on 10/11/2022) 90 tablet 3   • colesevelam (WELCHOL) 625 mg tablet TAKE 2 TABLETS THREE TIMES A DAY WITH MEALS 540 tablet 3   • diclofenac (VOLTAREN) 75 mg EC tablet Take 1 tablet (75 mg total) by mouth 2 (two) times a day 60 tablet 2   • hydrochlorothiazide (HYDRODIURIL) 50 mg tablet TAKE 1 TABLET DAILY 90 tablet 0   • levothyroxine (Euthyrox) 50 mcg tablet Take 1 tablet (50 mcg total) by mouth daily in the early morning 90 tablet 3   • meclizine (ANTIVERT) 25 mg tablet Take 1 tablet (25 mg total) by mouth every 8 (eight) hours 30 tablet 2     No current facility-administered medications for this visit  No Known Allergies   Immunizations:     Immunization History   Administered Date(s) Administered   • COVID-19 MODERNA VACC 0 25 ML IM BOOSTER 02/08/2022   • COVID-19 MODERNA VACC 0 5 ML IM 05/27/2021, 06/24/2021   • Tdap 09/07/2010      Health Maintenance:         Topic Date Due   • Breast Cancer Screening: Mammogram  07/25/2023   • Colorectal Cancer Screening  07/26/2029   • HIV Screening  Completed   • Hepatitis C Screening  Completed         Topic Date Due   • Pneumococcal Vaccine: Pediatrics (0 to 5 Years) and At-Risk Patients (6 to 59 Years) (1 - PCV) Never done   • DTaP,Tdap,and Td Vaccines (2 - Td or Tdap) 09/07/2020   • COVID-19 Vaccine (4 - Booster for Moderna series) 06/08/2022      Medicare Screening Tests and Risk Assessments:         Health Risk Assessment:   Patient rates overall health as very good  Patient feels that their physical health rating is same  Eyesight was rated as same  Hearing was rated as same   Patient feels that their emotional and mental health rating is same  Patients states they are never, rarely angry  Patient states they are never, rarely unusually tired/fatigued  Pain experienced in the last 7 days has been some  Patient's pain rating has been 1/10  Patient states that she has experienced no weight loss or gain in last 6 months  Fall Risk Screening: In the past year, patient has experienced: history of falling in past year    Number of falls: 1  Injured during fall?: No    Feels unsteady when standing or walking?: No    Worried about falling?: No      Urinary Incontinence Screening:   Patient has leaked urine accidently in the last six months  Home Safety:  Patient does not have trouble with stairs inside or outside of their home  Patient has no working smoke alarms and has no working carbon monoxide detector  Home safety hazards include: none  Nutrition:   Current diet is Regular  Medications:   Patient is currently taking over-the-counter supplements  OTC medications include: see medication list  Patient is able to manage medications  Activities of Daily Living (ADLs)/Instrumental Activities of Daily Living (IADLs):   Walk and transfer into and out of bed and chair?: Yes  Dress and groom yourself?: Yes    Bathe or shower yourself?: Yes    Feed yourself?  Yes  Do your laundry/housekeeping?: Yes  Manage your money, pay your bills and track your expenses?: Yes  Make your own meals?: Yes    Do your own shopping?: Yes    Previous Hospitalizations:   Any hospitalizations or ED visits within the last 12 months?: No      Advance Care Planning:   Living will: No    Durable POA for healthcare: No      PREVENTIVE SCREENINGS      Cardiovascular Screening:    General: Screening Current      Diabetes Screening:     General: Screening Current      Colorectal Cancer Screening:     General: Screening Current      Breast Cancer Screening:     General: Screening Current      Cervical Cancer Screening:    General: Screening Not Indicated      Lung Cancer Screening:     General: Screening Not Indicated      Hepatitis C Screening:    General: Screening Current    Screening, Brief Intervention, and Referral to Treatment (SBIRT)    Screening  Typical number of drinks in a day: 0  Typical number of drinks in a week: 0  Interpretation: Low risk drinking behavior      Single Item Drug Screening:  How often have you used an illegal drug (including marijuana) or a prescription medication for non-medical reasons in the past year? never    Single Item Drug Screen Score: 0  Interpretation: Negative screen for possible drug use disorder    No exam data present     Physical Exam:     /80   Pulse 80   Temp (!) 96 3 °F (35 7 °C)   Resp 16   Ht 5' 3" (1 6 m)   Wt 94 3 kg (208 lb)   BMI 36 85 kg/m²     Physical Exam     Adam Vázquez, DO

## 2022-10-11 NOTE — PROGRESS NOTES
ADULT ANNUAL [id number] [address] PRIMARY CARE    NAME: Jaylin Ragsdale  AGE: 61 y o  SEX: female  : 1955     DATE: 10/11/2022     Assessment and Plan:  Patient is experiencing intermittent calf pain which occurs randomly  The patient takes a diuretic daily would consider the possibility of a magnesium deficiency the patient is to take an over-the-counter magnesium supplement    Hypertensive cardiovascular disease with blood pressure controlled on the current regimen of Lotrel 10 20 and HydroDIURIL 50 mg    Hypothyroidism euthyroid on current replacement    Hyperlipidemia with a total cholesterol 215 triglycerides of 119 HDL of 73 and LDL of 118 treated with Welchol    Tobacco use disorder the patient informed that she had 2 seizures as a child so we will discontinue Wellbutrin and opt for nicotine replacement in the form of a nicotine patch    Chronic intermittent vertigo Antivert 25 mg t i d   Is effective therapy    Class 2 obesity with a BMI of 36 85 diet discussed    Pre diabetes with a hemoglobin A1c of 5 8    Vitamin-D deficiency corrected with supplementation    Patient has a history of colon polyps and does have colonoscopy performed every 3 years at [address] kidney disease stage 2 mild     Problem List Items Addressed This Visit    None     Visit Diagnoses     Hypothyroidism, unspecified type    -  Primary    Relevant Medications    levothyroxine (Euthyrox) 50 mcg tablet    Essential hypertension        Relevant Orders    CBC and differential    Comprehensive metabolic panel    Leg cramps        Relevant Medications    magnesium (MAGTAB) 84 MG (7MEQ) TBCR    Tobacco use disorder        Relevant Medications    nicotine (NICODERM CQ) 21 mg/24 hr TD 24 hr patch    Class 2 severe obesity with serious comorbidity and body mass index (BMI) of 37 0 to 37 9 in adult, unspecified obesity type ([address] )        Pre-diabetes        Relevant Orders    Hemoglobin A1c (w/out EAG)    Dyslipidemia        Relevant Orders    Lipid Panel with Direct LDL reflex    Vitamin D deficiency        Relevant Orders    Vitamin D 25 hydroxy    Acquired hypothyroidism        Relevant Medications    levothyroxine (Euthyrox) 50 mcg tablet    Other Relevant Orders    TSH, 3rd generation with Free T4 reflex          Immunizations and preventive care screenings were discussed with patient today  Appropriate education was printed on patient's after visit summary  Counseling:  • Alcohol/drug use: discussed moderation in alcohol intake, the recommendations for healthy alcohol use, and avoidance of illicit drug use  • Dental Health: discussed importance of regular tooth brushing, flossing, and dental visits  • Injury prevention: discussed safety/seat belts, safety helmets, smoke detectors, carbon dioxide detectors, and smoking near bedding or upholstery  • Sexual health: discussed sexually transmitted diseases, partner selection, use of condoms, avoidance of unintended pregnancy, and contraceptive alternatives  · Exercise: the importance of regular exercise/physical activity was discussed  Recommend exercise 3-5 times per week for at least 30 minutes  Depression Screening and Follow-up Plan: Patient was screened for depression during today's encounter  They screened negative with a PHQ-2 score of 0  Return in about 6 months (around 4/11/2023)  Chief Complaint:     Chief Complaint   Patient presents with   • Annual Exam   • Follow-up     6 month check up    • Leg Pain     C/o right calf pain; off and on x couple of weeks; patient denies warmth, redness or swelling to site  • Results     Labs, mammogram, and colonoscopy (colonoscopy is listed as surgical pathology under labs in care everywhere)      History of Present Illness:     Adult Annual Physical   Patient here for a comprehensive physical exam  The patient reports no problems      Diet and Physical Activity  · Diet/Nutrition: well balanced diet  · Exercise: walking, 5-7 times a week on average and less than 30 minutes on average  Depression Screening  PHQ-2/9 Depression Screening    Little interest or pleasure in doing things: 0 - not at all  Feeling down, depressed, or hopeless: 0 - not at all  PHQ-2 Score: 0  PHQ-2 Interpretation: Negative depression screen       General Health  · Sleep: sleeps well  · Hearing: normal - bilateral   · Vision: wears glasses  · Dental: regular dental visits, brushes teeth once daily and flosses teeth occasionally  /GYN Health  · Patient is: postmenopausal  · Last menstrual period:yrs  · Contraceptive method: Menopausal      Review of Systems:     Review of Systems   Constitutional: Negative for chills and fever  HENT: Negative for ear pain and sore throat  Eyes: Negative for pain and visual disturbance  Respiratory: Negative for cough and shortness of breath  Cardiovascular: Negative for chest pain and palpitations  Gastrointestinal: Negative for abdominal pain and vomiting  Genitourinary: Negative for dysuria and hematuria  Musculoskeletal: Negative for arthralgias and back pain  Calfs cramping   Skin: Negative for color change and rash  Neurological: Negative for seizures and syncope  All other systems reviewed and are negative  Past Medical History:     History reviewed  No pertinent past medical history  Past Surgical History:     Past Surgical History:   Procedure Laterality Date   • TOTAL VAGINAL HYSTERECTOMY  2003      Social History:     Social History     Socioeconomic History   • Marital status:       Spouse name: None   • Number of children: None   • Years of education: None   • Highest education level: None   Occupational History   • None   Tobacco Use   • Smoking status: Current Every Day Smoker     Packs/day: 0 75   • Smokeless tobacco: Never Used   Vaping Use   • Vaping Use: Never used   Substance and Sexual Activity   • Alcohol use: Never   • Drug use: Never   • Sexual activity: None   Other Topics Concern   • None   Social History Narrative   • None     Social Determinants of Health     Financial Resource Strain: Low Risk    • Difficulty of Paying Living Expenses: Not very hard   Food Insecurity: Not on file   Transportation Needs: No Transportation Needs   • Lack of Transportation (Medical): No   • Lack of Transportation (Non-Medical): No   Physical Activity: Not on file   Stress: Not on file   Social Connections: Not on file   Intimate Partner Violence: Not on file   Housing Stability: Not on file      Family History:     History reviewed  No pertinent family history  Current Medications:     Current Outpatient Medications   Medication Sig Dispense Refill   • levothyroxine (Euthyrox) 50 mcg tablet Take 1 tablet (50 mcg total) by mouth daily in the early morning 90 tablet 1   • magnesium (MAGTAB) 84 MG (7MEQ) TBCR Take 1 tablet (84 mg total) by mouth daily 90 tablet 1   • nicotine (NICODERM CQ) 21 mg/24 hr TD 24 hr patch Place 1 patch on the skin every 24 hours 28 patch 5   • amLODIPine-benazepril (LOTREL) 10-20 MG per capsule TAKE 1 CAPSULE DAILY 90 capsule 3   • buPROPion (Wellbutrin XL) 150 mg 24 hr tablet Take 1 tablet (150 mg total) by mouth every morning (Patient not taking: Reported on 10/11/2022) 90 tablet 3   • colesevelam (WELCHOL) 625 mg tablet TAKE 2 TABLETS THREE TIMES A DAY WITH MEALS 540 tablet 3   • diclofenac (VOLTAREN) 75 mg EC tablet Take 1 tablet (75 mg total) by mouth 2 (two) times a day 60 tablet 2   • hydrochlorothiazide (HYDRODIURIL) 50 mg tablet TAKE 1 TABLET DAILY 90 tablet 0   • meclizine (ANTIVERT) 25 mg tablet Take 1 tablet (25 mg total) by mouth every 8 (eight) hours 30 tablet 2     No current facility-administered medications for this visit        Allergies:     No Known Allergies   Physical Exam:     /80   Pulse 80   Temp (!) 96 3 °F (35 7 °C)   Resp 16   Ht 5' 3" (1 6 m)   Wt 94 3 kg (208 lb)   BMI 36 85 kg/m²     Physical Exam  Vitals and nursing note reviewed  Constitutional:       General: She is not in acute distress  Appearance: She is well-developed  She is obese  HENT:      Head: Normocephalic and atraumatic  Eyes:      Conjunctiva/sclera: Conjunctivae normal    Cardiovascular:      Rate and Rhythm: Normal rate and regular rhythm  Heart sounds: No murmur heard  Pulmonary:      Effort: Pulmonary effort is normal  No respiratory distress  Breath sounds: Normal breath sounds  Abdominal:      Palpations: Abdomen is soft  Tenderness: There is no abdominal tenderness  Musculoskeletal:      Cervical back: Neck supple  Skin:     General: Skin is warm and dry  Neurological:      Mental Status: She is alert            Tierra Grider DO  [facility]

## 2022-10-11 NOTE — LETTER
Procedure Request Form: Colonoscopy      Date Requested: 10/11/22  Patient: Jose Juan Palma  Patient : 1955   Referring Provider: Yuan Saunders, DO        Date of Procedure ______________________________       The above patient has informed us that they have completed their   most recent Colonoscopy at your facility  Please complete   this form and attach all corresponding procedure reports/results  Comments __________________________________________________________  ____________________________________________________________________  ____________________________________________________________________  ____________________________________________________________________    Facility Completing Procedure _________________________________________    Form Completed By (print name) _______________________________________      Signature __________________________________________________________      These reports are needed for  compliance  Please fax this completed form and a copy of the procedure report to our office located at [place] [address] as soon as possible to [phone] naomie Cruz: Phone [phone]    We thank you for your assistance in treating our mutual patient

## 2022-10-18 NOTE — TELEPHONE ENCOUNTER
As a follow-up, a second attempt has been made for outreach via fax, please see Contacts section for details      Thank you  Best Escalera

## 2022-10-20 ENCOUNTER — TELEPHONE (OUTPATIENT)
Dept: FAMILY MEDICINE CLINIC | Facility: CLINIC | Age: 67
End: 2022-10-20

## 2022-11-01 NOTE — TELEPHONE ENCOUNTER
As a final attempt, a third outreach has been made via fax to facility  and telephone call to facility  Please see Contacts section for details  This encounter will be closed and completed by end of day  Should we receive the requested information because of previous outreach attempts, the requested patient's chart will be updated appropriately       Thank you  Marcela Pacheco

## 2022-11-07 ENCOUNTER — TELEPHONE (OUTPATIENT)
Dept: FAMILY MEDICINE CLINIC | Facility: CLINIC | Age: 67
End: 2022-11-07

## 2022-11-17 ENCOUNTER — HOSPITAL ENCOUNTER (OUTPATIENT)
Dept: CT IMAGING | Facility: HOSPITAL | Age: 67
Discharge: HOME | End: 2022-11-17
Attending: FAMILY MEDICINE

## 2022-11-17 DIAGNOSIS — Z12.2 ENCOUNTER FOR SCREENING FOR LUNG CANCER: ICD-10-CM

## 2022-11-28 DIAGNOSIS — I10 ESSENTIAL HYPERTENSION: ICD-10-CM

## 2022-11-28 RX ORDER — HYDROCHLOROTHIAZIDE 50 MG/1
TABLET ORAL
Qty: 90 TABLET | Refills: 3 | Status: SHIPPED | OUTPATIENT
Start: 2022-11-28

## 2022-12-19 ENCOUNTER — VBI (OUTPATIENT)
Dept: ADMINISTRATIVE | Facility: OTHER | Age: 67
End: 2022-12-19

## 2023-02-20 ENCOUNTER — TELEPHONE (OUTPATIENT)
Dept: FAMILY MEDICINE CLINIC | Facility: CLINIC | Age: 68
End: 2023-02-20

## 2023-02-20 DIAGNOSIS — U07.1 COVID-19: Primary | ICD-10-CM

## 2023-02-20 RX ORDER — NIRMATRELVIR AND RITONAVIR 300-100 MG
3 KIT ORAL 2 TIMES DAILY
Qty: 30 TABLET | Refills: 0 | Status: SHIPPED | OUTPATIENT
Start: 2023-02-20 | End: 2023-02-25

## 2023-02-20 NOTE — TELEPHONE ENCOUNTER
C/O positive covid test yesterday - has mild symptoms that started 02/18/2023 - headache - low grade fever - dry cough - did advise Vit D, C, and zinc - tylenol or motrin - and mussinex for cough - quarantine 5 days and mask the next 5 days - did have second covid booster 12/10/2022

## 2023-02-20 NOTE — TELEPHONE ENCOUNTER
Informed the patient that she is a candidate for Paxlovid    Informed the patient that its not FDA approved but has been given emergency release to prevent progression of the disease and lessen the need for emergency room or hospitalization the patient is in agreement to take Paxlovid

## 2023-03-07 ENCOUNTER — TELEPHONE (OUTPATIENT)
Dept: FAMILY MEDICINE CLINIC | Facility: CLINIC | Age: 68
End: 2023-03-07

## 2023-03-07 DIAGNOSIS — E03.9 HYPOTHYROIDISM, UNSPECIFIED TYPE: ICD-10-CM

## 2023-03-07 RX ORDER — LEVOTHYROXINE SODIUM 0.05 MG/1
50 TABLET ORAL
Qty: 90 TABLET | Refills: 1 | Status: SHIPPED | OUTPATIENT
Start: 2023-03-07

## 2023-03-07 NOTE — TELEPHONE ENCOUNTER
Please send 90 day script for levothyroxine to [facility], patient has an appt next month, she does not use [facility] anymore because of her insurance so she can't get a refill from them

## 2023-04-05 ENCOUNTER — TELEPHONE (OUTPATIENT)
Dept: FAMILY MEDICINE CLINIC | Facility: CLINIC | Age: 68
End: 2023-04-05

## 2023-04-05 NOTE — TELEPHONE ENCOUNTER
Inform patient that her potassium was low at 3 3 suggested that she eats more potatoes oranges and bananas to bring her potassium up and she should also take an over-the-counter magnesium supplement

## 2023-04-26 ENCOUNTER — HOSPITAL ENCOUNTER (OUTPATIENT)
Dept: BONE DENSITY | Facility: HOSPITAL | Age: 68
Discharge: HOME | End: 2023-04-26
Attending: FAMILY MEDICINE

## 2023-04-26 VITALS — HEIGHT: 63 IN | WEIGHT: 208 LBS | BODY MASS INDEX: 36.86 KG/M2

## 2023-04-26 DIAGNOSIS — Z78.0 ASYMPTOMATIC MENOPAUSAL STATE: ICD-10-CM

## 2023-07-27 ENCOUNTER — TELEPHONE (OUTPATIENT)
Dept: ADMINISTRATIVE | Facility: OTHER | Age: 68
End: 2023-07-27

## 2023-07-27 NOTE — TELEPHONE ENCOUNTER
Upon review of the In Basket request we were able to locate, review, and update the patient chart as requested for Mammogram.    Any additional questions or concerns should be emailed to the Practice Liaisons via the appropriate education email address, please do not reply via In Basket.     Thank you  Tee Dowling

## 2023-07-27 NOTE — TELEPHONE ENCOUNTER
----- Message from [facility] sent at 7/27/2023  8:17 AM EDT -----  Patient had her Mammo through [facility] on 7/26/23 - please link to our health maintenance.     Thank you,  Cristiano Gamboa

## 2023-08-29 DIAGNOSIS — E03.9 HYPOTHYROIDISM, UNSPECIFIED TYPE: ICD-10-CM

## 2023-08-29 RX ORDER — LEVOTHYROXINE SODIUM 0.05 MG/1
TABLET ORAL
Qty: 90 TABLET | Refills: 1 | Status: SHIPPED | OUTPATIENT
Start: 2023-08-29

## 2023-09-30 ENCOUNTER — APPOINTMENT (OUTPATIENT)
Dept: LAB | Facility: HOSPITAL | Age: 68
End: 2023-09-30
Payer: MEDICARE

## 2023-09-30 DIAGNOSIS — E55.9 VITAMIN D DEFICIENCY: ICD-10-CM

## 2023-09-30 DIAGNOSIS — I10 ESSENTIAL HYPERTENSION: ICD-10-CM

## 2023-09-30 DIAGNOSIS — R73.03 PRE-DIABETES: ICD-10-CM

## 2023-09-30 DIAGNOSIS — E03.9 HYPOTHYROIDISM, UNSPECIFIED TYPE: ICD-10-CM

## 2023-09-30 LAB
25(OH)D3 SERPL-MCNC: 62.5 NG/ML (ref 30–100)
ALBUMIN SERPL BCP-MCNC: 4.1 G/DL (ref 3.5–5)
ALP SERPL-CCNC: 67 U/L (ref 34–104)
ALT SERPL W P-5'-P-CCNC: 14 U/L (ref 7–52)
ANION GAP SERPL CALCULATED.3IONS-SCNC: 10 MMOL/L
AST SERPL W P-5'-P-CCNC: 15 U/L (ref 13–39)
BASOPHILS # BLD AUTO: 0.05 THOUSANDS/ÂΜL (ref 0–0.1)
BASOPHILS NFR BLD AUTO: 1 % (ref 0–1)
BILIRUB SERPL-MCNC: 0.59 MG/DL (ref 0.2–1)
BUN SERPL-MCNC: 13 MG/DL (ref 5–25)
CALCIUM SERPL-MCNC: 9.8 MG/DL (ref 8.4–10.2)
CHLORIDE SERPL-SCNC: 98 MMOL/L (ref 96–108)
CHOLEST SERPL-MCNC: 217 MG/DL
CO2 SERPL-SCNC: 29 MMOL/L (ref 21–32)
CREAT SERPL-MCNC: 0.77 MG/DL (ref 0.6–1.3)
EOSINOPHIL # BLD AUTO: 0.42 THOUSAND/ÂΜL (ref 0–0.61)
EOSINOPHIL NFR BLD AUTO: 6 % (ref 0–6)
ERYTHROCYTE [DISTWIDTH] IN BLOOD BY AUTOMATED COUNT: 12.9 % (ref 11.6–15.1)
EST. AVERAGE GLUCOSE BLD GHB EST-MCNC: 120 MG/DL
GFR SERPL CREATININE-BSD FRML MDRD: 81 ML/MIN/1.73SQ M
GLUCOSE P FAST SERPL-MCNC: 91 MG/DL (ref 65–99)
HBA1C MFR BLD: 5.8 %
HCT VFR BLD AUTO: 41.7 % (ref 34.8–46.1)
HDLC SERPL-MCNC: 65 MG/DL
HGB BLD-MCNC: 13.7 G/DL (ref 11.5–15.4)
IMM GRANULOCYTES # BLD AUTO: 0.01 THOUSAND/UL (ref 0–0.2)
IMM GRANULOCYTES NFR BLD AUTO: 0 % (ref 0–2)
LDLC SERPL CALC-MCNC: 118 MG/DL (ref 0–100)
LYMPHOCYTES # BLD AUTO: 2.13 THOUSANDS/ÂΜL (ref 0.6–4.47)
LYMPHOCYTES NFR BLD AUTO: 30 % (ref 14–44)
MCH RBC QN AUTO: 29.1 PG (ref 26.8–34.3)
MCHC RBC AUTO-ENTMCNC: 32.9 G/DL (ref 31.4–37.4)
MCV RBC AUTO: 89 FL (ref 82–98)
MONOCYTES # BLD AUTO: 0.58 THOUSAND/ÂΜL (ref 0.17–1.22)
MONOCYTES NFR BLD AUTO: 8 % (ref 4–12)
NEUTROPHILS # BLD AUTO: 3.81 THOUSANDS/ÂΜL (ref 1.85–7.62)
NEUTS SEG NFR BLD AUTO: 55 % (ref 43–75)
NRBC BLD AUTO-RTO: 0 /100 WBCS
PLATELET # BLD AUTO: 341 THOUSANDS/UL (ref 149–390)
PMV BLD AUTO: 9.1 FL (ref 8.9–12.7)
POTASSIUM SERPL-SCNC: 3.3 MMOL/L (ref 3.5–5.3)
PROT SERPL-MCNC: 6.6 G/DL (ref 6.4–8.4)
RBC # BLD AUTO: 4.7 MILLION/UL (ref 3.81–5.12)
SODIUM SERPL-SCNC: 137 MMOL/L (ref 135–147)
TRIGL SERPL-MCNC: 171 MG/DL
TSH SERPL DL<=0.05 MIU/L-ACNC: 2.14 UIU/ML (ref 0.45–4.5)
WBC # BLD AUTO: 7 THOUSAND/UL (ref 4.31–10.16)

## 2023-09-30 PROCEDURE — 85025 COMPLETE CBC W/AUTO DIFF WBC: CPT

## 2023-09-30 PROCEDURE — 80053 COMPREHEN METABOLIC PANEL: CPT

## 2023-09-30 PROCEDURE — 82306 VITAMIN D 25 HYDROXY: CPT

## 2023-09-30 PROCEDURE — 84443 ASSAY THYROID STIM HORMONE: CPT

## 2023-09-30 PROCEDURE — 36415 COLL VENOUS BLD VENIPUNCTURE: CPT

## 2023-09-30 PROCEDURE — 80061 LIPID PANEL: CPT

## 2023-09-30 PROCEDURE — 83036 HEMOGLOBIN GLYCOSYLATED A1C: CPT

## 2023-10-10 ENCOUNTER — OFFICE VISIT (OUTPATIENT)
Dept: FAMILY MEDICINE CLINIC | Facility: CLINIC | Age: 68
End: 2023-10-10
Payer: MEDICARE

## 2023-10-10 ENCOUNTER — TELEPHONE (OUTPATIENT)
Dept: ADMINISTRATIVE | Facility: OTHER | Age: 68
End: 2023-10-10

## 2023-10-10 VITALS
SYSTOLIC BLOOD PRESSURE: 134 MMHG | HEART RATE: 76 BPM | RESPIRATION RATE: 16 BRPM | DIASTOLIC BLOOD PRESSURE: 80 MMHG | BODY MASS INDEX: 37.21 KG/M2 | HEIGHT: 63 IN | WEIGHT: 210 LBS | TEMPERATURE: 96.8 F

## 2023-10-10 DIAGNOSIS — E78.5 DYSLIPIDEMIA: ICD-10-CM

## 2023-10-10 DIAGNOSIS — R73.03 PRE-DIABETES: ICD-10-CM

## 2023-10-10 DIAGNOSIS — E03.9 HYPOTHYROIDISM, UNSPECIFIED TYPE: ICD-10-CM

## 2023-10-10 DIAGNOSIS — Z00.00 MEDICARE ANNUAL WELLNESS VISIT, INITIAL: Primary | ICD-10-CM

## 2023-10-10 DIAGNOSIS — E66.01 CLASS 2 SEVERE OBESITY WITH SERIOUS COMORBIDITY AND BODY MASS INDEX (BMI) OF 37.0 TO 37.9 IN ADULT, UNSPECIFIED OBESITY TYPE: ICD-10-CM

## 2023-10-10 DIAGNOSIS — I10 ESSENTIAL HYPERTENSION: ICD-10-CM

## 2023-10-10 DIAGNOSIS — N18.2 STAGE 2 CHRONIC KIDNEY DISEASE: ICD-10-CM

## 2023-10-10 DIAGNOSIS — Z12.2 ENCOUNTER FOR SCREENING FOR LUNG CANCER: ICD-10-CM

## 2023-10-10 DIAGNOSIS — Z86.010 HISTORY OF COLON POLYPS: ICD-10-CM

## 2023-10-10 DIAGNOSIS — M17.12 PRIMARY OSTEOARTHRITIS OF LEFT KNEE: ICD-10-CM

## 2023-10-10 DIAGNOSIS — E55.9 VITAMIN D DEFICIENCY: ICD-10-CM

## 2023-10-10 DIAGNOSIS — F17.210 SMOKING GREATER THAN 20 PACK YEARS: ICD-10-CM

## 2023-10-10 PROCEDURE — G0402 INITIAL PREVENTIVE EXAM: HCPCS | Performed by: FAMILY MEDICINE

## 2023-10-10 PROCEDURE — 99214 OFFICE O/P EST MOD 30 MIN: CPT | Performed by: FAMILY MEDICINE

## 2023-10-10 RX ORDER — COLESEVELAM 180 1/1
1250 TABLET ORAL 2 TIMES DAILY WITH MEALS
Qty: 540 TABLET | Refills: 1 | Status: SHIPPED | OUTPATIENT
Start: 2023-10-10

## 2023-10-10 RX ORDER — AMLODIPINE BESYLATE AND BENAZEPRIL HYDROCHLORIDE 10; 20 MG/1; MG/1
1 CAPSULE ORAL DAILY
Qty: 90 CAPSULE | Refills: 1 | Status: SHIPPED | OUTPATIENT
Start: 2023-10-10

## 2023-10-10 RX ORDER — HYDROCHLOROTHIAZIDE 50 MG/1
50 TABLET ORAL DAILY
Qty: 90 TABLET | Refills: 1 | Status: SHIPPED | OUTPATIENT
Start: 2023-10-10

## 2023-10-10 NOTE — PROGRESS NOTES
Assessment and Plan: Degenerative joint disease of the left knee with pain mostly at night the patient was given samples of Voltaren gel and Voltaren gel has been ordered to use at bedtime.   Orthopedic notes reviewed and appreciated    Essential hypertension with a blood pressure controlled on the current regimen    Dyslipidemia with a total cholesterol of 217 triglycerides of 171 HDL of 65 and LDL of 118 on WelChol twice daily    Hypothyroidism euthyroid on levothyroxine 50 mcg    Tobacco use disorder the patient continues to smoke is not willing to quit at this time however is agreeable to lung cancer screening program    Prediabetes we will assess a hemoglobin A1c    The patient has had a recent mammogram.  And has had colonoscopy June 8, 2022 and has a history of colon polyps    The patient is agreeable to mammography    Class II obesity diet and exercise have been discussed     Problem List Items Addressed This Visit    None  Visit Diagnoses     Medicare annual wellness visit, initial    -  Primary    Essential hypertension        Relevant Medications    amLODIPine-benazepril (LOTREL) 10-20 MG per capsule    hydrochlorothiazide (HYDRODIURIL) 50 mg tablet    Other Relevant Orders    CBC and differential    Comprehensive metabolic panel    Dyslipidemia        Relevant Medications    colesevelam (WELCHOL) 625 mg tablet    Other Relevant Orders    Lipid Panel with Direct LDL reflex    Hypothyroidism, unspecified type        Smoking greater than 20 pack years        Relevant Orders    CT lung screening program    Pre-diabetes        Relevant Orders    Hemoglobin A1c (w/out EAG)    History of colon polyps        Encounter for screening for lung cancer        Relevant Orders    CT lung screening program    Class 2 severe obesity with serious comorbidity and body mass index (BMI) of 37.0 to 37.9 in adult, unspecified obesity type         Vitamin D deficiency        Relevant Orders    Vitamin D 25 hydroxy    Stage 2 chronic kidney disease        Relevant Medications    hydrochlorothiazide (HYDRODIURIL) 50 mg tablet    Primary osteoarthritis of left knee        Relevant Medications    Diclofenac Sodium (VOLTAREN) 1 %        BMI Counseling: Body mass index is 37.2 kg/m². The BMI is above normal. Nutrition recommendations include decreasing portion sizes, encouraging healthy choices of fruits and vegetables, decreasing fast food intake, consuming healthier snacks, limiting drinks that contain sugar, moderation in carbohydrate intake, increasing intake of lean protein, reducing intake of saturated and trans fat and reducing intake of cholesterol. Rationale for BMI follow-up plan is due to patient being overweight or obese. Depression Screening and Follow-up Plan: Patient was screened for depression during today's encounter. They screened negative with a PHQ-2 score of 0. Preventive health issues were discussed with patient, and age appropriate screening tests were ordered as noted in patient's After Visit Summary. Personalized health advice and appropriate referrals for health education or preventive services given if needed, as noted in patient's After Visit Summary. History of Present Illness:     Patient presents for a Medicare Wellness Visit    HPI   Patient Care Team:  Ashly Obando DO as PCP - General (Family Medicine)     Review of Systems:     Review of Systems   Constitutional: Negative for chills and fever. HENT: Negative for ear pain and sore throat. Eyes: Negative for pain and visual disturbance. Respiratory: Negative for cough and shortness of breath. Cardiovascular: Negative for chest pain and palpitations. Gastrointestinal: Negative for abdominal pain and vomiting. Genitourinary: Negative for dysuria and hematuria. Musculoskeletal: Negative for arthralgias and back pain. Skin: Negative for color change and rash. Neurological: Negative for seizures and syncope.    All other systems reviewed and are negative. Problem List:     Patient Active Problem List   Diagnosis   • Malignant neoplasm of soft palate (HCC)   • Class 2 severe obesity due to excess calories with serious comorbidity and body mass index (BMI) of 37.0 to 37.9 in adult       Past Medical and Surgical History:     History reviewed. No pertinent past medical history. Past Surgical History:   Procedure Laterality Date   • TOTAL VAGINAL HYSTERECTOMY  2003      Family History:     History reviewed. No pertinent family history. Social History:     Social History     Socioeconomic History   • Marital status:      Spouse name: None   • Number of children: None   • Years of education: None   • Highest education level: None   Occupational History   • None   Tobacco Use   • Smoking status: Every Day     Packs/day: 0.75     Years: 32.00     Total pack years: 24.00     Types: Cigarettes   • Smokeless tobacco: Never   Vaping Use   • Vaping Use: Never used   Substance and Sexual Activity   • Alcohol use: Never   • Drug use: Never   • Sexual activity: None   Other Topics Concern   • None   Social History Narrative   • None     Social Determinants of Health     Financial Resource Strain: Low Risk  (10/3/2023)    Overall Financial Resource Strain (CARDIA)    • Difficulty of Paying Living Expenses: Not hard at all   Food Insecurity: Not on file   Transportation Needs: No Transportation Needs (10/3/2023)    PRAPARE - Transportation    • Lack of Transportation (Medical): No    • Lack of Transportation (Non-Medical):  No   Physical Activity: Not on file   Stress: Not on file   Social Connections: Not on file   Intimate Partner Violence: Not on file   Housing Stability: Not on file      Medications and Allergies:     Current Outpatient Medications   Medication Sig Dispense Refill   • amLODIPine-benazepril (LOTREL) 10-20 MG per capsule Take 1 capsule by mouth daily 90 capsule 1   • colesevelam (WELCHOL) 625 mg tablet Take 2 tablets (1,250 mg total) by mouth 2 (two) times a day with meals 540 tablet 1   • Diclofenac Sodium (VOLTAREN) 1 % Apply 2 g topically 4 (four) times a day 100 g 9   • hydrochlorothiazide (HYDRODIURIL) 50 mg tablet Take 1 tablet (50 mg total) by mouth daily 90 tablet 1   • diclofenac (VOLTAREN) 75 mg EC tablet Take 1 tablet (75 mg total) by mouth 2 (two) times a day 60 tablet 2   • levothyroxine 50 mcg tablet take 1 tablet by mouth once daily  IN THE EARLY MORNING 90 tablet 1   • meclizine (ANTIVERT) 25 mg tablet Take 1 tablet (25 mg total) by mouth every 8 (eight) hours 30 tablet 2   • NON FORMULARY Calcium 333mg-Magnesium 133mg-Zinc 200IU; one tablet BID     • POTASSIUM PO Take by mouth Potassium 99mg, take 2 tablets daily       No current facility-administered medications for this visit. No Known Allergies   Immunizations:     Immunization History   Administered Date(s) Administered   • COVID-19 MODERNA VACC 0.25 ML IM BOOSTER 02/08/2022   • COVID-19 MODERNA VACC 0.5 ML IM 05/27/2021, 06/24/2021   • COVID-19 Moderna Vac BIVALENT 12 Yr+ IM 0.5 ML 12/10/2022   • Tdap 09/07/2010      Health Maintenance:         Topic Date Due   • Colorectal Cancer Screening  06/08/2023   • Lung Cancer Screening  11/17/2023   • Breast Cancer Screening: Mammogram  10/10/2024 (Originally 7/25/2023)   • HIV Screening  Completed   • Hepatitis C Screening  Completed         Topic Date Due   • DTaP,Tdap,and Td Vaccines (2 - Td or Tdap) 09/07/2020      Medicare Screening Tests and Risk Assessments: Roger Ganser is here for her Welcome to Medicare visit. Health Risk Assessment:   Patient rates overall health as very good. Patient feels that their physical health rating is same. Patient is satisfied with their life. Eyesight was rated as same. Hearing was rated as same. Patient feels that their emotional and mental health rating is same. Patients states they are never, rarely angry. Patient states they are sometimes unusually tired/fatigued.  Pain experienced in the last 7 days has been some. Patient's pain rating has been 4/10. Patient states that she has experienced no weight loss or gain in last 6 months. Depression Screening:   PHQ-2 Score: 0      Fall Risk Screening: In the past year, patient has experienced: no history of falling in past year      Urinary Incontinence Screening:   Patient has not leaked urine accidently in the last six months. Home Safety:  Patient does not have trouble with stairs inside or outside of their home. Patient has no working smoke alarms and has no working carbon monoxide detector. Home safety hazards include: none. Nutrition:   Current diet is Regular. Medications:   Patient is currently taking over-the-counter supplements. OTC medications include: B complex, vitamin c, d, e, Ca, Mg, Zn, potassium. Patient is able to manage medications. Activities of Daily Living (ADLs)/Instrumental Activities of Daily Living (IADLs):   Walk and transfer into and out of bed and chair?: Yes  Dress and groom yourself?: Yes    Bathe or shower yourself?: Yes    Feed yourself?  Yes  Do your laundry/housekeeping?: Yes  Manage your money, pay your bills and track your expenses?: Yes  Make your own meals?: Yes    Do your own shopping?: Yes    Previous Hospitalizations:   Any hospitalizations or ED visits within the last 12 months?: No      Advance Care Planning:   Living will: No    Durable POA for healthcare: No    Advanced directive: No      PREVENTIVE SCREENINGS      Cardiovascular Screening:    General: Screening Current      Diabetes Screening:     General: Screening Current      Colorectal Cancer Screening:     General: Screening Current      Breast Cancer Screening:     General: Screening Current      Cervical Cancer Screening:    General: Screening Not Indicated      Lung Cancer Screening:     General: Screening Current      Hepatitis C Screening:    General: Screening Current    Screening, Brief Intervention, and Referral to Treatment (SBIRT)    Screening  Typical number of drinks in a day: 0  Typical number of drinks in a week: 0  Interpretation: Low risk drinking behavior. AUDIT-C Screenin) How often did you have a drink containing alcohol in the past year? never  2) How many drinks did you have on a typical day when you were drinking in the past year? 0  3) How often did you have 6 or more drinks on one occasion in the past year? never    AUDIT-C Score: 0  Interpretation: Score 0-2 (female): Negative screen for alcohol misuse    Single Item Drug Screening:  How often have you used an illegal drug (including marijuana) or a prescription medication for non-medical reasons in the past year? never    Single Item Drug Screen Score: 0  Interpretation: Negative screen for possible drug use disorder    No results found. Physical Exam:     /80   Pulse 76   Temp (!) 96.8 °F (36 °C)   Resp 16   Ht 5' 3" (1.6 m)   Wt 95.3 kg (210 lb)   BMI 37.20 kg/m²     Physical Exam  Vitals and nursing note reviewed. Constitutional:       General: She is not in acute distress. Appearance: Normal appearance. She is well-developed. She is obese. HENT:      Head: Normocephalic and atraumatic. Right Ear: Tympanic membrane and ear canal normal.      Left Ear: Tympanic membrane and ear canal normal.      Nose: Nose normal.      Mouth/Throat:      Mouth: Mucous membranes are moist.      Pharynx: Oropharynx is clear. Eyes:      Extraocular Movements: Extraocular movements intact. Conjunctiva/sclera: Conjunctivae normal.      Pupils: Pupils are equal, round, and reactive to light. Cardiovascular:      Rate and Rhythm: Normal rate and regular rhythm. Pulses: Normal pulses. Heart sounds: Normal heart sounds. No murmur heard. Pulmonary:      Effort: Pulmonary effort is normal. No respiratory distress. Breath sounds: Normal breath sounds. Abdominal:      General: Abdomen is flat.  Bowel sounds are normal.      Palpations: Abdomen is soft. Tenderness: There is no abdominal tenderness. Musculoskeletal:         General: No swelling. Normal range of motion. Cervical back: Normal range of motion and neck supple. Skin:     General: Skin is warm and dry. Capillary Refill: Capillary refill takes less than 2 seconds. Neurological:      General: No focal deficit present. Mental Status: She is alert and oriented to person, place, and time. Psychiatric:         Mood and Affect: Mood normal.          Lesli Gerard, DO I discussed with her that she is a candidate for lung cancer CT screening. The following Shared Decision-Making points were covered:  Benefits of screening were discussed, including the rates of reduction in death from lung cancer and other causes. Harms of screening were reviewed, including false positive tests, radiation exposure levels, risks of invasive procedures, risks of complications of screening, and risk of overdiagnosis. I counseled on the importance of adherence to annual lung cancer LDCT screening, impact of co-morbidities, and ability or willingness to undergo diagnosis and treatment. I counseled on the importance of maintaining abstinence as a former smoker or was counseled on the importance of smoking cessation if a current smoker    Review of Eligibility Criteria: She meets all of the criteria for Lung Cancer Screening. She is 74 y.o. She has 20 pack year tobacco history and is a current smoker or has quit within the past 15 years  She presents no signs or symptoms of lung cancer    After discussion, the patient decided to elect lung cancer screening.

## 2023-10-10 NOTE — PATIENT INSTRUCTIONS
Medicare Preventive Visit Patient Instructions  Thank you for completing your Welcome to Medicare Visit or Medicare Annual Wellness Visit today. Your next wellness visit will be due in one year (10/10/2024). The screening/preventive services that you may require over the next 5-10 years are detailed below. Some tests may not apply to you based off risk factors and/or age. Screening tests ordered at today's visit but not completed yet may show as past due. Also, please note that scanned in results may not display below. Preventive Screenings:  Service Recommendations Previous Testing/Comments   Colorectal Cancer Screening  * Colonoscopy    * Fecal Occult Blood Test (FOBT)/Fecal Immunochemical Test (FIT)  * Fecal DNA/Cologuard Test  * Flexible Sigmoidoscopy Age: 43-73 years old   Colonoscopy: every 10 years (may be performed more frequently if at higher risk)  OR  FOBT/FIT: every 1 year  OR  Cologuard: every 3 years  OR  Sigmoidoscopy: every 5 years  Screening may be recommended earlier than age 39 if at higher risk for colorectal cancer. Also, an individualized decision between you and your healthcare provider will decide whether screening between the ages of 77-80 would be appropriate. Colonoscopy: 06/08/2022  FOBT/FIT: Not on file  Cologuard: Not on file  Sigmoidoscopy: Not on file    Screening Current     Breast Cancer Screening Age: 36 years old  Frequency: every 1-2 years  Not required if history of left and right mastectomy Mammogram: 07/25/2022    Screening Current   Cervical Cancer Screening Between the ages of 21-29, pap smear recommended once every 3 years. Between the ages of 32-69, can perform pap smear with HPV co-testing every 5 years.    Recommendations may differ for women with a history of total hysterectomy, cervical cancer, or abnormal pap smears in past. Pap Smear: Not on file    Screening Not Indicated   Hepatitis C Screening Once for adults born between 1945 and 1965  More frequently in patients at high risk for Hepatitis C Hep C Antibody: 03/29/2022    Screening Current   Diabetes Screening 1-2 times per year if you're at risk for diabetes or have pre-diabetes Fasting glucose: 91 mg/dL (9/30/2023)  A1C: 5.8 % (9/30/2023)  Screening Current   Cholesterol Screening Once every 5 years if you don't have a lipid disorder. May order more often based on risk factors. Lipid panel: 09/30/2023    Screening Current     Other Preventive Screenings Covered by Medicare:  1. Abdominal Aortic Aneurysm (AAA) Screening: covered once if your at risk. You're considered to be at risk if you have a family history of AAA. 2. Lung Cancer Screening: covers low dose CT scan once per year if you meet all of the following conditions: (1) Age 48-67; (2) No signs or symptoms of lung cancer; (3) Current smoker or have quit smoking within the last 15 years; (4) You have a tobacco smoking history of at least 20 pack years (packs per day multiplied by number of years you smoked); (5) You get a written order from a healthcare provider. 3. Glaucoma Screening: covered annually if you're considered high risk: (1) You have diabetes OR (2) Family history of glaucoma OR (3)  aged 48 and older OR (3)  American aged 72 and older  3. Osteoporosis Screening: covered every 2 years if you meet one of the following conditions: (1) You're estrogen deficient and at risk for osteoporosis based off medical history and other findings; (2) Have a vertebral abnormality; (3) On glucocorticoid therapy for more than 3 months; (4) Have primary hyperparathyroidism; (5) On osteoporosis medications and need to assess response to drug therapy. · Last bone density test (DXA Scan): 04/26/2023.  5. HIV Screening: covered annually if you're between the age of 15-65. Also covered annually if you are younger than 13 and older than 72 with risk factors for HIV infection.  For pregnant patients, it is covered up to 3 times per pregnancy. Immunizations:  Immunization Recommendations   Influenza Vaccine Annual influenza vaccination during flu season is recommended for all persons aged >= 6 months who do not have contraindications   Pneumococcal Vaccine   * Pneumococcal conjugate vaccine = PCV13 (Prevnar 13), PCV15 (Vaxneuvance), PCV20 (Prevnar 20)  * Pneumococcal polysaccharide vaccine = PPSV23 (Pneumovax) Adults 09-29 yo with certain risk factors or if 65+ yo  · If never received any pneumonia vaccine: recommend Prevnar 20 (PCV20)  · Give PCV20 if previously received 1 dose of PCV13 or PPSV23   Hepatitis B Vaccine 3 dose series if at intermediate or high risk (ex: diabetes, end stage renal disease, liver disease)   Respiratory syncytial virus (RSV) Vaccine - COVERED BY MEDICARE PART D  * RSVPreF3 (Arexvy) CDC recommends that adults 61years of age and older may receive a single dose of RSV vaccine using shared clinical decision-making (SCDM)   Tetanus (Td) Vaccine - COST NOT COVERED BY MEDICARE PART B Following completion of primary series, a booster dose should be given every 10 years to maintain immunity against tetanus. Td may also be given as tetanus wound prophylaxis. Tdap Vaccine - COST NOT COVERED BY MEDICARE PART B Recommended at least once for all adults. For pregnant patients, recommended with each pregnancy. Shingles Vaccine (Shingrix) - COST NOT COVERED BY MEDICARE PART B  2 shot series recommended in those 19 years and older who have or will have weakened immune systems or those 50 years and older     Health Maintenance Due:      Topic Date Due   • Colorectal Cancer Screening  06/08/2023   • Lung Cancer Screening  11/17/2023   • Breast Cancer Screening: Mammogram  10/10/2024 (Originally 7/25/2023)   • HIV Screening  Completed   • Hepatitis C Screening  Completed     Immunizations Due:      Topic Date Due   • DTaP,Tdap,and Td Vaccines (2 - Td or Tdap) 09/07/2020     Advance Directives   What are advance directives? Advance directives are legal documents that state your wishes and plans for medical care. These plans are made ahead of time in case you lose your ability to make decisions for yourself. Advance directives can apply to any medical decision, such as the treatments you want, and if you want to donate organs. What are the types of advance directives? There are many types of advance directives, and each state has rules about how to use them. You may choose a combination of any of the following:  · Living will: This is a written record of the treatment you want. You can also choose which treatments you do not want, which to limit, and which to stop at a certain time. This includes surgery, medicine, IV fluid, and tube feedings. · Durable power of  for healthcare [facility]): This is a written record that states who you want to make healthcare choices for you when you are unable to make them for yourself. This person, called a proxy, is usually a family member or a friend. You may choose more than 1 proxy. · Do not resuscitate (DNR) order:  A DNR order is used in case your heart stops beating or you stop breathing. It is a request not to have certain forms of treatment, such as CPR. A DNR order may be included in other types of advance directives. · Medical directive: This covers the care that you want if you are in a coma, near death, or unable to make decisions for yourself. You can list the treatments you want for each condition. Treatment may include pain medicine, surgery, blood transfusions, dialysis, IV or tube feedings, and a ventilator (breathing machine). · Values history: This document has questions about your views, beliefs, and how you feel and think about life. This information can help others choose the care that you would choose. Why are advance directives important? An advance directive helps you control your care.  Although spoken wishes may be used, it is better to have your wishes written down. Spoken wishes can be misunderstood, or not followed. Treatments may be given even if you do not want them. An advance directive may make it easier for your family to make difficult choices about your care. Cigarette Smoking and Your Health   Risks to your health if you smoke:  Nicotine and other chemicals found in tobacco damage every cell in your body. Even if you are a light smoker, you have an increased risk for cancer, heart disease, and lung disease. If you are pregnant or have diabetes, smoking increases your risk for complications. Benefits to your health if you stop smoking:   · You decrease respiratory symptoms such as coughing, wheezing, and shortness of breath. · You reduce your risk for cancers of the lung, mouth, throat, kidney, bladder, pancreas, stomach, and cervix. If you already have cancer, you increase the benefits of chemotherapy. You also reduce your risk for cancer returning or a second cancer from developing. · You reduce your risk for heart disease, blood clots, heart attack, and stroke. · You reduce your risk for lung infections, and diseases such as pneumonia, asthma, chronic bronchitis, and emphysema. · Your circulation improves. More oxygen can be delivered to your body. If you have diabetes, you lower your risk for complications, such as kidney, artery, and eye diseases. You also lower your risk for nerve damage. Nerve damage can lead to amputations, poor vision, and blindness. · You improve your body's ability to heal and to fight infections. For more information and support to stop smoking:   · [employer] go[employer]  Phone: 6- 133 - 748-5539  Web Address: www.[employer]  Weight Management   Why it is important to manage your weight:  Being overweight increases your risk of health conditions such as heart disease, high blood pressure, type 2 diabetes, and certain types of cancer.  It can also increase your risk for osteoarthritis, sleep apnea, and other respiratory problems. Aim for a slow, steady weight loss. Even a small amount of weight loss can lower your risk of health problems. How to lose weight safely:  A safe and healthy way to lose weight is to eat fewer calories and get regular exercise. You can lose up about 1 pound a week by decreasing the number of calories you eat by 500 calories each day. Healthy meal plan for weight management:  A healthy meal plan includes a variety of foods, contains fewer calories, and helps you stay healthy. A healthy meal plan includes the following:  · Eat whole-grain foods more often. A healthy meal plan should contain fiber. Fiber is the part of grains, fruits, and vegetables that is not broken down by your body. Whole-grain foods are healthy and provide extra fiber in your diet. Some examples of whole-grain foods are whole-wheat breads and pastas, oatmeal, brown rice, and bulgur. · Eat a variety of vegetables every day. Include dark, leafy greens such as spinach, kale, tom greens, and mustard greens. Eat yellow and orange vegetables such as carrots, sweet potatoes, and winter squash. · Eat a variety of fruits every day. Choose fresh or canned fruit (canned in its own juice or light syrup) instead of juice. Fruit juice has very little or no fiber. · Eat low-fat dairy foods. Drink fat-free (skim) milk or 1% milk. Eat fat-free yogurt and low-fat cottage cheese. Try low-fat cheeses such as mozzarella and other reduced-fat cheeses. · Choose meat and other protein foods that are low in fat. Choose beans or other legumes such as split peas or lentils. Choose fish, skinless poultry (chicken or turkey), or lean cuts of red meat (beef or pork). Before you cook meat or poultry, cut off any visible fat. · Use less fat and oil. Try baking foods instead of frying them. Add less fat, such as margarine, sour cream, regular salad dressing and mayonnaise to foods. Eat fewer high-fat foods.  Some examples of high-fat foods include french fries, doughnuts, ice cream, and cakes. · Eat fewer sweets. Limit foods and drinks that are high in sugar. This includes candy, cookies, regular soda, and sweetened drinks. Exercise:  Exercise at least 30 minutes per day on most days of the week. Some examples of exercise include walking, biking, dancing, and swimming. You can also fit in more physical activity by taking the stairs instead of the elevator or parking farther away from stores. Ask your healthcare provider about the best exercise plan for you. © Copyright [address] 2018 Information is for End User's use only and may not be sold, redistributed or otherwise used for commercial purposes.  All illustrations and images included in CareNotes® are the copyrighted property of [employer] or [address] [place] [address]

## 2023-10-10 NOTE — TELEPHONE ENCOUNTER
----- Message from Luis Harris sent at 10/10/2023  9:41 AM EDT -----  Regarding: Care Gaps Request  10/10/23 9:41 AM    Hello, our patient Radha Villanueva has had Mammogram completed/performed. Please assist in updating the patient chart by pulling the Care Everywhere (CE) document. The date of service is 07/26/2023.      Thank you,  Luis Harris  [facility] [address] [facility]

## 2023-10-12 NOTE — TELEPHONE ENCOUNTER
Upon review of the In Basket request we were able to locate, review, and update the patient chart as requested for Mammogram.    Any additional questions or concerns should be emailed to the Practice Liaisons via the appropriate education email address, please do not reply via In Basket.     Thank you  Nazia Rivas

## 2023-11-10 ENCOUNTER — TELEPHONE (OUTPATIENT)
Dept: FAMILY MEDICINE CLINIC | Facility: CLINIC | Age: 68
End: 2023-11-10

## 2023-11-22 ENCOUNTER — HOSPITAL ENCOUNTER (OUTPATIENT)
Dept: CT IMAGING | Facility: HOSPITAL | Age: 68
Discharge: HOME | End: 2023-11-22
Attending: FAMILY MEDICINE
Payer: MEDICARE

## 2023-11-22 DIAGNOSIS — F17.210 SMOKING GREATER THAN 20 PACK YEARS: ICD-10-CM

## 2023-11-22 DIAGNOSIS — Z12.2 ENCOUNTER FOR SCREENING FOR LUNG CANCER: ICD-10-CM

## 2023-11-22 PROCEDURE — 71271 CT THORAX LUNG CANCER SCR C-: CPT

## 2023-11-28 ENCOUNTER — TELEPHONE (OUTPATIENT)
Dept: FAMILY MEDICINE CLINIC | Facility: CLINIC | Age: 68
End: 2023-11-28

## 2023-11-28 NOTE — TELEPHONE ENCOUNTER
----- Message from Lina Marquez DO sent at 11/28/2023  1:20 PM EST -----  Advise patient of normal result and retest in one year

## 2024-02-19 DIAGNOSIS — E03.9 HYPOTHYROIDISM, UNSPECIFIED TYPE: ICD-10-CM

## 2024-02-19 RX ORDER — LEVOTHYROXINE SODIUM 0.05 MG/1
TABLET ORAL
Qty: 90 TABLET | Refills: 1 | Status: SHIPPED | OUTPATIENT
Start: 2024-02-19

## 2024-02-29 DIAGNOSIS — E78.5 DYSLIPIDEMIA: ICD-10-CM

## 2024-02-29 RX ORDER — COLESEVELAM 180 1/1
1250 TABLET ORAL 2 TIMES DAILY WITH MEALS
Qty: 540 TABLET | Refills: 1 | Status: SHIPPED | OUTPATIENT
Start: 2024-02-29

## 2024-03-26 ENCOUNTER — APPOINTMENT (OUTPATIENT)
Dept: LAB | Facility: HOSPITAL | Age: 69
End: 2024-03-26
Payer: MEDICARE

## 2024-03-26 DIAGNOSIS — E78.5 DYSLIPIDEMIA: ICD-10-CM

## 2024-03-26 DIAGNOSIS — E55.9 VITAMIN D DEFICIENCY: ICD-10-CM

## 2024-03-26 DIAGNOSIS — R73.03 PRE-DIABETES: ICD-10-CM

## 2024-03-26 DIAGNOSIS — I10 ESSENTIAL HYPERTENSION: ICD-10-CM

## 2024-03-26 LAB
25(OH)D3 SERPL-MCNC: 48.9 NG/ML (ref 30–100)
ALBUMIN SERPL BCP-MCNC: 3.9 G/DL (ref 3.5–5)
ALP SERPL-CCNC: 61 U/L (ref 34–104)
ALT SERPL W P-5'-P-CCNC: 13 U/L (ref 7–52)
ANION GAP SERPL CALCULATED.3IONS-SCNC: 7 MMOL/L (ref 4–13)
AST SERPL W P-5'-P-CCNC: 17 U/L (ref 13–39)
BASOPHILS # BLD AUTO: 0.06 THOUSANDS/ÂΜL (ref 0–0.1)
BASOPHILS NFR BLD AUTO: 1 % (ref 0–1)
BILIRUB SERPL-MCNC: 0.52 MG/DL (ref 0.2–1)
BUN SERPL-MCNC: 16 MG/DL (ref 5–25)
CALCIUM SERPL-MCNC: 9.7 MG/DL (ref 8.4–10.2)
CHLORIDE SERPL-SCNC: 100 MMOL/L (ref 96–108)
CHOLEST SERPL-MCNC: 204 MG/DL
CO2 SERPL-SCNC: 33 MMOL/L (ref 21–32)
CREAT SERPL-MCNC: 0.82 MG/DL (ref 0.6–1.3)
EOSINOPHIL # BLD AUTO: 0.36 THOUSAND/ÂΜL (ref 0–0.61)
EOSINOPHIL NFR BLD AUTO: 6 % (ref 0–6)
ERYTHROCYTE [DISTWIDTH] IN BLOOD BY AUTOMATED COUNT: 13.3 % (ref 11.6–15.1)
EST. AVERAGE GLUCOSE BLD GHB EST-MCNC: 123 MG/DL
GFR SERPL CREATININE-BSD FRML MDRD: 74 ML/MIN/1.73SQ M
GLUCOSE P FAST SERPL-MCNC: 95 MG/DL (ref 65–99)
HBA1C MFR BLD: 5.9 %
HCT VFR BLD AUTO: 42.8 % (ref 34.8–46.1)
HDLC SERPL-MCNC: 61 MG/DL
HGB BLD-MCNC: 13.9 G/DL (ref 11.5–15.4)
IMM GRANULOCYTES # BLD AUTO: 0.02 THOUSAND/UL (ref 0–0.2)
IMM GRANULOCYTES NFR BLD AUTO: 0 % (ref 0–2)
LDLC SERPL CALC-MCNC: 112 MG/DL (ref 0–100)
LYMPHOCYTES # BLD AUTO: 2.16 THOUSANDS/ÂΜL (ref 0.6–4.47)
LYMPHOCYTES NFR BLD AUTO: 34 % (ref 14–44)
MCH RBC QN AUTO: 28.5 PG (ref 26.8–34.3)
MCHC RBC AUTO-ENTMCNC: 32.5 G/DL (ref 31.4–37.4)
MCV RBC AUTO: 88 FL (ref 82–98)
MONOCYTES # BLD AUTO: 0.6 THOUSAND/ÂΜL (ref 0.17–1.22)
MONOCYTES NFR BLD AUTO: 9 % (ref 4–12)
NEUTROPHILS # BLD AUTO: 3.23 THOUSANDS/ÂΜL (ref 1.85–7.62)
NEUTS SEG NFR BLD AUTO: 50 % (ref 43–75)
NRBC BLD AUTO-RTO: 0 /100 WBCS
PLATELET # BLD AUTO: 312 THOUSANDS/UL (ref 149–390)
PMV BLD AUTO: 9.3 FL (ref 8.9–12.7)
POTASSIUM SERPL-SCNC: 3.6 MMOL/L (ref 3.5–5.3)
PROT SERPL-MCNC: 6.6 G/DL (ref 6.4–8.4)
RBC # BLD AUTO: 4.87 MILLION/UL (ref 3.81–5.12)
SODIUM SERPL-SCNC: 140 MMOL/L (ref 135–147)
TRIGL SERPL-MCNC: 157 MG/DL
WBC # BLD AUTO: 6.43 THOUSAND/UL (ref 4.31–10.16)

## 2024-03-26 PROCEDURE — 82306 VITAMIN D 25 HYDROXY: CPT

## 2024-03-26 PROCEDURE — 85025 COMPLETE CBC W/AUTO DIFF WBC: CPT

## 2024-03-26 PROCEDURE — 80061 LIPID PANEL: CPT

## 2024-03-26 PROCEDURE — 36415 COLL VENOUS BLD VENIPUNCTURE: CPT

## 2024-03-26 PROCEDURE — 83036 HEMOGLOBIN GLYCOSYLATED A1C: CPT

## 2024-03-26 PROCEDURE — 80053 COMPREHEN METABOLIC PANEL: CPT

## 2024-04-08 ENCOUNTER — TELEPHONE (OUTPATIENT)
Dept: FAMILY MEDICINE CLINIC | Facility: CLINIC | Age: 69
End: 2024-04-08

## 2024-04-08 NOTE — TELEPHONE ENCOUNTER
Called patient to reschedule due to the doctor being out of the office.  Left voicemail for patient to call office so her appointment can be rescheduled.

## 2024-04-16 ENCOUNTER — OFFICE VISIT (OUTPATIENT)
Dept: FAMILY MEDICINE CLINIC | Facility: CLINIC | Age: 69
End: 2024-04-16
Payer: MEDICARE

## 2024-04-16 ENCOUNTER — TELEPHONE (OUTPATIENT)
Dept: ADMINISTRATIVE | Facility: OTHER | Age: 69
End: 2024-04-16

## 2024-04-16 VITALS
BODY MASS INDEX: 35.79 KG/M2 | HEART RATE: 84 BPM | HEIGHT: 63 IN | WEIGHT: 202 LBS | SYSTOLIC BLOOD PRESSURE: 136 MMHG | TEMPERATURE: 97.7 F | DIASTOLIC BLOOD PRESSURE: 84 MMHG | RESPIRATION RATE: 20 BRPM

## 2024-04-16 DIAGNOSIS — E55.9 VITAMIN D DEFICIENCY: ICD-10-CM

## 2024-04-16 DIAGNOSIS — E78.5 DYSLIPIDEMIA: ICD-10-CM

## 2024-04-16 DIAGNOSIS — C05.1 MALIGNANT NEOPLASM OF SOFT PALATE (HCC): ICD-10-CM

## 2024-04-16 DIAGNOSIS — E66.01 CLASS 2 SEVERE OBESITY DUE TO EXCESS CALORIES WITH SERIOUS COMORBIDITY AND BODY MASS INDEX (BMI) OF 35.0 TO 35.9 IN ADULT (HCC): ICD-10-CM

## 2024-04-16 DIAGNOSIS — I10 ESSENTIAL HYPERTENSION: ICD-10-CM

## 2024-04-16 DIAGNOSIS — R09.89 BILATERAL CAROTID BRUITS: Primary | ICD-10-CM

## 2024-04-16 DIAGNOSIS — E03.9 HYPOTHYROIDISM, UNSPECIFIED TYPE: ICD-10-CM

## 2024-04-16 DIAGNOSIS — M77.8 TENDINITIS OF LEFT SHOULDER: ICD-10-CM

## 2024-04-16 DIAGNOSIS — R73.03 PRE-DIABETES: ICD-10-CM

## 2024-04-16 PROCEDURE — G2211 COMPLEX E/M VISIT ADD ON: HCPCS | Performed by: FAMILY MEDICINE

## 2024-04-16 PROCEDURE — 99214 OFFICE O/P EST MOD 30 MIN: CPT | Performed by: FAMILY MEDICINE

## 2024-04-16 RX ORDER — COLESEVELAM 180 1/1
1250 TABLET ORAL 2 TIMES DAILY WITH MEALS
Qty: 540 TABLET | Refills: 1 | Status: SHIPPED | OUTPATIENT
Start: 2024-04-16

## 2024-04-16 RX ORDER — DICLOFENAC SODIUM 75 MG/1
75 TABLET, DELAYED RELEASE ORAL 2 TIMES DAILY
Qty: 60 TABLET | Refills: 2 | Status: SHIPPED | OUTPATIENT
Start: 2024-04-16

## 2024-04-16 RX ORDER — LEVOTHYROXINE SODIUM 0.05 MG/1
50 TABLET ORAL DAILY
Qty: 90 TABLET | Refills: 1 | Status: SHIPPED | OUTPATIENT
Start: 2024-04-16

## 2024-04-16 RX ORDER — AMLODIPINE BESYLATE AND BENAZEPRIL HYDROCHLORIDE 10; 20 MG/1; MG/1
1 CAPSULE ORAL DAILY
Qty: 90 CAPSULE | Refills: 1 | Status: SHIPPED | OUTPATIENT
Start: 2024-04-16

## 2024-04-16 RX ORDER — HYDROCHLOROTHIAZIDE 50 MG/1
50 TABLET ORAL DAILY
Qty: 90 TABLET | Refills: 1 | Status: SHIPPED | OUTPATIENT
Start: 2024-04-16

## 2024-04-16 NOTE — TELEPHONE ENCOUNTER
----- Message from Teri Polischak sent at 4/16/2024  8:40 AM EDT -----  Regarding: Care Gaps Request  04/16/24 8:40 AM    Hello, our patient Sheron Marquez has had Medicare AWV completed/performed. Please assist in updating the patient chart by pulling the document from Encounter Tab within Chart Review. The date of service is 10/10/2023. Patient had a PE 10/11/2022 under her private insurance, and then 10/10/2023 appears to be under Medicare insurance per my . Please review.      Thank you,  Teri Polischak  [facility]

## 2024-04-16 NOTE — TELEPHONE ENCOUNTER
Upon review of the In Basket request we were able to identify that the patient had the requested item(s) completed internally. Internal labs/procedures/tests are not able to be linked to . HM will show as updated but a hyperlink to the document is not possible at this time. The item(s) requested can be found within the Chart Review tabs.     Any additional questions or concerns should be emailed to the Practice Liaisons via the appropriate education email address, please do not reply via In Basket.    Thank you  Salma Yousif

## 2024-04-16 NOTE — PROGRESS NOTES
Assessment/Plan: Carotid bruit in an adult smoker with dyslipidemia the plan is to perform a carotid Doppler    Hypertensive cardiovascular disease with a blood pressure controlled on the current regimen    History of colon polyps the patient states that she will be contacting gastroenterology for repeat colonoscopy    Tobacco use disorder the patient continues to smoke    Obesity with a BMI of 35.78 the patient has lost 2 pounds since the last visit and has begun an exercise program    History of prediabetes we will perform a hemoglobin A1c    Left shoulder tendinitis Voltaren is used sparingly    Prediabetes with a hemoglobin A1c at 5.9 diet has been discussed    History of malignant neoplasia of the soft palate currently in remission            Problem List Items Addressed This Visit     Malignant neoplasm of soft palate (HCC)    Class 2 severe obesity due to excess calories with serious comorbidity and body mass index (BMI) of 37.0 to 37.9 in adult (HCC)   Other Visit Diagnoses     Bilateral carotid bruits    -  Primary    Relevant Orders    VAS carotid complete study    Essential hypertension        Relevant Medications    amLODIPine-benazepril (LOTREL) 10-20 MG per capsule    hydroCHLOROthiazide 50 mg tablet    Other Relevant Orders    CBC and differential    Comprehensive metabolic panel    Lipid Panel with Direct LDL reflex    Dyslipidemia        Relevant Medications    colesevelam (WELCHOL) 625 mg tablet    Other Relevant Orders    CBC and differential    Comprehensive metabolic panel    Lipid Panel with Direct LDL reflex    Tendinitis of left shoulder        Relevant Medications    diclofenac (VOLTAREN) 75 mg EC tablet    Hypothyroidism, unspecified type        Relevant Medications    levothyroxine 50 mcg tablet    Other Relevant Orders    TSH w/Reflex    Pre-diabetes        Relevant Orders    Hemoglobin A1c (w/out EAG)    Vitamin D deficiency        Relevant Orders    Vitamin D 25 hydroxy            Diagnoses and all orders for this visit:    Bilateral carotid bruits  -     VAS carotid complete study; Future    Essential hypertension  -     amLODIPine-benazepril (LOTREL) 10-20 MG per capsule; Take 1 capsule by mouth daily  -     hydroCHLOROthiazide 50 mg tablet; Take 1 tablet (50 mg total) by mouth daily  -     CBC and differential; Future  -     Comprehensive metabolic panel; Future  -     Lipid Panel with Direct LDL reflex; Future    Dyslipidemia  -     colesevelam (WELCHOL) 625 mg tablet; Take 2 tablets (1,250 mg total) by mouth 2 (two) times a day with meals  -     CBC and differential; Future  -     Comprehensive metabolic panel; Future  -     Lipid Panel with Direct LDL reflex; Future    Tendinitis of left shoulder  -     diclofenac (VOLTAREN) 75 mg EC tablet; Take 1 tablet (75 mg total) by mouth 2 (two) times a day    Hypothyroidism, unspecified type  -     levothyroxine 50 mcg tablet; Take 1 tablet (50 mcg total) by mouth daily  -     TSH w/Reflex; Future    Pre-diabetes  -     Hemoglobin A1c (w/out EAG); Future    Vitamin D deficiency  -     Vitamin D 25 hydroxy; Future    Malignant neoplasm of soft palate (HCC)    Class 2 severe obesity due to excess calories with serious comorbidity and body mass index (BMI) of 35.0 to 35.9 in adult (HCC)        No problem-specific Assessment & Plan notes found for this encounter.      PHQ-2/9 Depression Screening    Little interest or pleasure in doing things: 0 - not at all  Feeling down, depressed, or hopeless: 0 - not at all          Body mass index is 35.78 kg/m².    BMI Counseling: Body mass index is 35.78 kg/m². The BMI     Subjective:      Patient ID: Sheron Marquez is a 68 y.o. female.    Patient presents for 6-month checkup on hypertension, prediabetes and dyslipidemia        The following portions of the patient's history were reviewed and updated as appropriate:   She has no past medical history on file.,  does not have any pertinent problems on file.,    has a past surgical history that includes Total vaginal hysterectomy (2003).,  family history is not on file.,   reports that she has been smoking. She has a 24 pack-year smoking history. She has never used smokeless tobacco. She reports that she does not drink alcohol and does not use drugs.,  has No Known Allergies..  Current Outpatient Medications   Medication Sig Dispense Refill   • amLODIPine-benazepril (LOTREL) 10-20 MG per capsule Take 1 capsule by mouth daily 90 capsule 1   • colesevelam (WELCHOL) 625 mg tablet Take 2 tablets (1,250 mg total) by mouth 2 (two) times a day with meals 540 tablet 1   • diclofenac (VOLTAREN) 75 mg EC tablet Take 1 tablet (75 mg total) by mouth 2 (two) times a day 60 tablet 2   • hydroCHLOROthiazide 50 mg tablet Take 1 tablet (50 mg total) by mouth daily 90 tablet 1   • levothyroxine 50 mcg tablet Take 1 tablet (50 mcg total) by mouth daily 90 tablet 1   • Diclofenac Sodium (VOLTAREN) 1 % Apply 2 g topically 4 (four) times a day 100 g 9   • meclizine (ANTIVERT) 25 mg tablet Take 1 tablet (25 mg total) by mouth every 8 (eight) hours 30 tablet 2   • NON FORMULARY Calcium 333mg-Magnesium 133mg-Zinc 200IU; one tablet BID     • POTASSIUM PO Take by mouth Potassium 99mg, take 2 tablets daily       No current facility-administered medications for this visit.       Review of Systems   Constitutional:  Negative for chills and fever.   HENT:  Negative for ear pain and sore throat.    Eyes:  Negative for pain and visual disturbance.   Respiratory:  Negative for cough and shortness of breath.    Cardiovascular:  Negative for chest pain and palpitations.   Gastrointestinal:  Negative for abdominal pain and vomiting.   Genitourinary:  Negative for dysuria and hematuria.   Musculoskeletal:  Negative for arthralgias and back pain.   Skin:  Negative for color change and rash.   Neurological:  Negative for seizures and syncope.   All other systems reviewed and are negative.        Objective:    BP  "136/84   Pulse 84   Temp 97.7 °F (36.5 °C)   Resp 20   Ht 5' 3\" (1.6 m)   Wt 91.6 kg (202 lb)   BMI 35.78 kg/m²   Body mass index is 35.78 kg/m².     Physical Exam  Constitutional:       Appearance: She is well-developed. She is obese.   HENT:      Head: Normocephalic and atraumatic.      Right Ear: Tympanic membrane, ear canal and external ear normal.      Left Ear: Tympanic membrane, ear canal and external ear normal.      Nose: Nose normal.      Mouth/Throat:      Mouth: Mucous membranes are moist.      Pharynx: Oropharynx is clear.   Eyes:      Extraocular Movements: Extraocular movements intact.      Conjunctiva/sclera: Conjunctivae normal.      Pupils: Pupils are equal, round, and reactive to light.   Neck:      Vascular: Carotid bruit present.   Cardiovascular:      Rate and Rhythm: Normal rate and regular rhythm.      Pulses: Normal pulses.      Heart sounds: Normal heart sounds.   Pulmonary:      Effort: Pulmonary effort is normal.      Breath sounds: Normal breath sounds.   Abdominal:      General: Abdomen is flat. Bowel sounds are normal.      Palpations: Abdomen is soft.      Tenderness: There is no abdominal tenderness.   Musculoskeletal:         General: Normal range of motion.      Cervical back: Normal range of motion and neck supple.   Skin:     General: Skin is warm and dry.      Capillary Refill: Capillary refill takes less than 2 seconds.   Neurological:      General: No focal deficit present.      Mental Status: She is alert and oriented to person, place, and time.   Psychiatric:         Mood and Affect: Mood normal.         Behavior: Behavior normal.         Thought Content: Thought content normal.         Judgment: Judgment normal.           "

## 2024-04-18 ENCOUNTER — HOSPITAL ENCOUNTER (OUTPATIENT)
Dept: NON INVASIVE DIAGNOSTICS | Facility: HOSPITAL | Age: 69
Discharge: HOME | End: 2024-04-18
Payer: MEDICARE

## 2024-04-18 DIAGNOSIS — R09.89 BILATERAL CAROTID BRUITS: ICD-10-CM

## 2024-04-18 PROCEDURE — 93880 EXTRACRANIAL BILAT STUDY: CPT | Performed by: SURGERY

## 2024-04-18 PROCEDURE — 93880 EXTRACRANIAL BILAT STUDY: CPT

## 2024-04-24 ENCOUNTER — TELEPHONE (OUTPATIENT)
Age: 69
End: 2024-04-24

## 2024-04-24 NOTE — TELEPHONE ENCOUNTER
PA for levothyroxine 50 mcg tablet     Submitted via    []CMM-KEY   [x][employer]-Case ID # [id number]   []Faxed to plan   []Other website   []Phone call Case ID #     Office notes sent, clinical questions answered. Awaiting determination    Turnaround time for your insurance to make a decision on your Prior Authorization can take 7-21 business days.

## 2024-04-25 NOTE — TELEPHONE ENCOUNTER
PA for levothyroxine 50 mcg tablet  not required     Reason- (screenshot if applicable)              Message sent to provider pool No

## 2024-06-08 ENCOUNTER — APPOINTMENT (EMERGENCY)
Dept: RADIOLOGY | Facility: HOSPITAL | Age: 69
End: 2024-06-08
Payer: MEDICARE

## 2024-06-08 ENCOUNTER — HOSPITAL ENCOUNTER (EMERGENCY)
Facility: HOSPITAL | Age: 69
Discharge: HOME | End: 2024-06-08
Attending: EMERGENCY MEDICINE
Payer: MEDICARE

## 2024-06-08 VITALS
DIASTOLIC BLOOD PRESSURE: 89 MMHG | TEMPERATURE: 97.8 F | OXYGEN SATURATION: 96 % | SYSTOLIC BLOOD PRESSURE: 186 MMHG | RESPIRATION RATE: 18 BRPM | HEART RATE: 88 BPM

## 2024-06-08 DIAGNOSIS — S52.691A OTHER CLOSED FRACTURE OF DISTAL END OF RIGHT ULNA, INITIAL ENCOUNTER: Primary | ICD-10-CM

## 2024-06-08 PROCEDURE — 73030 X-RAY EXAM OF SHOULDER: CPT

## 2024-06-08 PROCEDURE — 73070 X-RAY EXAM OF ELBOW: CPT

## 2024-06-08 PROCEDURE — 99285 EMERGENCY DEPT VISIT HI MDM: CPT | Performed by: EMERGENCY MEDICINE

## 2024-06-08 PROCEDURE — 73110 X-RAY EXAM OF WRIST: CPT

## 2024-06-08 PROCEDURE — 96372 THER/PROPH/DIAG INJ SC/IM: CPT

## 2024-06-08 PROCEDURE — 71101 X-RAY EXAM UNILAT RIBS/CHEST: CPT

## 2024-06-08 PROCEDURE — 99283 EMERGENCY DEPT VISIT LOW MDM: CPT

## 2024-06-08 PROCEDURE — 73090 X-RAY EXAM OF FOREARM: CPT

## 2024-06-08 PROCEDURE — 29125 APPL SHORT ARM SPLINT STATIC: CPT | Performed by: EMERGENCY MEDICINE

## 2024-06-08 RX ORDER — ACETAMINOPHEN 325 MG/1
975 TABLET ORAL ONCE
Status: COMPLETED | OUTPATIENT
Start: 2024-06-08 | End: 2024-06-08

## 2024-06-08 RX ORDER — KETOROLAC TROMETHAMINE 30 MG/ML
15 INJECTION, SOLUTION INTRAMUSCULAR; INTRAVENOUS ONCE
Status: COMPLETED | OUTPATIENT
Start: 2024-06-08 | End: 2024-06-08

## 2024-06-08 RX ORDER — OXYCODONE HYDROCHLORIDE 5 MG/1
5 TABLET ORAL EVERY 6 HOURS PRN
Qty: 7 TABLET | Refills: 0 | Status: SHIPPED | OUTPATIENT
Start: 2024-06-08

## 2024-06-08 RX ADMIN — ACETAMINOPHEN 975 MG: 325 TABLET, FILM COATED ORAL at 15:36

## 2024-06-08 RX ADMIN — KETOROLAC TROMETHAMINE 15 MG: 30 INJECTION, SOLUTION INTRAMUSCULAR at 15:36

## 2024-06-08 NOTE — ED PROVIDER NOTES
History  Chief Complaint   Patient presents with    Arm Injury     Mowing her grass with ridding , struck right arm on stairs, pain in right arm and ribs      68-year-old female known past medical history presenting after injuring her right arm.  Patient states that she was on a riding lawnmower, road into something, possibly some stairs hitting her right wrist primarily.  Also having pain in right shoulder and right lower ribs.  Was able to complete mowing the lawn and drive herself to the hospital.  Tried icing without improvement.  Is not tried any medications.  Did not fall off the lawnmower.  Did not hit her head/neck/back.  Has never seriously hurt this extremity before.        Prior to Admission Medications   Prescriptions Last Dose Informant Patient Reported? Taking?   Diclofenac Sodium (VOLTAREN) 1 %   No No   Sig: Apply 2 g topically 4 (four) times a day   NON FORMULARY   Yes No   Sig: Calcium 333mg-Magnesium 133mg-Zinc 200IU; one tablet BID   POTASSIUM PO   Yes No   Sig: Take by mouth Potassium 99mg, take 2 tablets daily   amLODIPine-benazepril (LOTREL) 10-20 MG per capsule   No No   Sig: Take 1 capsule by mouth daily   colesevelam (WELCHOL) 625 mg tablet   No No   Sig: Take 2 tablets (1,250 mg total) by mouth 2 (two) times a day with meals   diclofenac (VOLTAREN) 75 mg EC tablet   No No   Sig: Take 1 tablet (75 mg total) by mouth 2 (two) times a day   hydroCHLOROthiazide 50 mg tablet   No No   Sig: Take 1 tablet (50 mg total) by mouth daily   levothyroxine 50 mcg tablet   No No   Sig: Take 1 tablet (50 mcg total) by mouth daily   meclizine (ANTIVERT) 25 mg tablet   No No   Sig: Take 1 tablet (25 mg total) by mouth every 8 (eight) hours      Facility-Administered Medications: None       History reviewed. No pertinent past medical history.    Past Surgical History:   Procedure Laterality Date    TOTAL VAGINAL HYSTERECTOMY  2003       History reviewed. No pertinent family history.  I have reviewed  and agree with the history as documented.    E-Cigarette/Vaping    E-Cigarette Use Never User      E-Cigarette/Vaping Substances     Social History     Tobacco Use    Smoking status: Every Day     Current packs/day: 0.75     Average packs/day: 0.8 packs/day for 32.0 years (24.0 ttl pk-yrs)     Types: Cigarettes    Smokeless tobacco: Never   Vaping Use    Vaping status: Never Used   Substance Use Topics    Alcohol use: Never    Drug use: Never        Review of Systems   Constitutional:  Negative for activity change and fever.   Respiratory:  Negative for shortness of breath.    Musculoskeletal:  Positive for arthralgias. Negative for back pain and gait problem.   Skin:  Negative for wound.   Allergic/Immunologic: Negative for immunocompromised state.   Neurological:  Negative for dizziness, syncope and headaches.   Psychiatric/Behavioral:  Negative for self-injury.    All other systems reviewed and are negative.      Physical Exam  ED Triage Vitals   Temperature Pulse Respirations Blood Pressure SpO2   06/08/24 1514 06/08/24 1514 06/08/24 1514 06/08/24 1514 06/08/24 1514   97.8 °F (36.6 °C) 88 18 (!) 186/89 96 %      Temp Source Heart Rate Source Patient Position - Orthostatic VS BP Location FiO2 (%)   06/08/24 1514 06/08/24 1514 06/08/24 1514 06/08/24 1514 --   Temporal Monitor Sitting Left arm       Pain Score       06/08/24 1536       7             Orthostatic Vital Signs  Vitals:    06/08/24 1514   BP: (!) 186/89   Pulse: 88   Patient Position - Orthostatic VS: Sitting       Physical Exam  Vitals and nursing note reviewed.   Constitutional:       Appearance: She is not ill-appearing, toxic-appearing or diaphoretic.   HENT:      Head: Normocephalic and atraumatic.      Right Ear: External ear normal.      Left Ear: External ear normal.      Nose: Nose normal.   Eyes:      General: No scleral icterus.        Right eye: No discharge.         Left eye: No discharge.      Pupils: Pupils are equal, round, and reactive  to light.   Cardiovascular:      Rate and Rhythm: Normal rate.      Pulses: Normal pulses.   Pulmonary:      Effort: Pulmonary effort is normal. No respiratory distress.      Breath sounds: No stridor.   Abdominal:      General: Abdomen is flat. There is no distension.      Hernia: No hernia is present.   Musculoskeletal:         General: Normal range of motion.        Arms:       Cervical back: Normal range of motion.      Comments: Motor, sensation, capillary refill intact in the right hand.  Able to oppose all fingers and abduct all fingers against resistance.  Did not attempt wrist flexion/extension due to location of injury.  No pain in the elbow.  Pain with ROM of shoulder though difficult to discern from pain from the wrist.   Skin:     General: Skin is warm and dry.   Neurological:      General: No focal deficit present.      Mental Status: She is alert. Mental status is at baseline.   Psychiatric:         Mood and Affect: Mood normal.         Behavior: Behavior normal.         ED Medications  Medications   acetaminophen (TYLENOL) tablet 975 mg (975 mg Oral Given 6/8/24 1536)   ketorolac (TORADOL) injection 15 mg (15 mg Intramuscular Given 6/8/24 1536)       Diagnostic Studies  Results Reviewed       None                   XR wrist 3+ views RIGHT   ED Interpretation by Mart Dia MD (06/08 2708)   Abnormal   Comminuted distal unla fracture      XR elbow 2 vw RIGHT   ED Interpretation by Enrique Aragon MD (06/08 3977)   X-ray independently interpreted by me.  No fracture or dislocation. No Foreign bodies.         XR shoulder 2+ views RIGHT   ED Interpretation by Enrique Aragon MD (06/08 6656)   X-ray independently interpreted by me.  No fracture or dislocation. No Foreign bodies.           XR ribs with pa chest min 3 views RIGHT   ED Interpretation by Enrique Aragon MD (06/08 1166)   Chest xray independently interpreted by me.  No acute cardiopulmonary disease. No subdiaphragmatic free air. No  fracture or dislocation        XR forearm 2 views RIGHT   ED Interpretation by Enrique Aragon MD (06/08 1644)   Abnormal   X-ray independently interpreted by me.  Acute minimally displaced distal ulnar fracture dislocation            Procedures  Orthopedic injury treatment    Date/Time: 6/8/2024 5:44 PM    Performed by: Enrique Aragon MD  Authorized by: Enrique Aragon MD    Patient Location:  ED  [place] Protocol:  Consent: Verbal consent obtained.  Risks and benefits: risks, benefits and alternatives were discussed  Consent given by: patient  Radiology Images displayed and confirmed. If images not available, report reviewed: imaging studies available  Required items: required blood products, implants, devices, and special equipment available  Patient identity confirmed: verbally with patient    Injury location:  Wrist  Location details:  Right wrist  Injury type:  Fracture-dislocation  Neurovascular status: Neurovascularly intact    Distal perfusion: normal    Neurological function: normal    Range of motion: normal    Local anesthesia used?: No    General anesthesia used?: No    Manipulation performed?: No    Immobilization:  Splint  Splint type:  Sugar tong  Supplies used:  Ortho-Glass and cotton padding  Neurovascular status: Neurovascularly intact    Distal perfusion: normal    Neurological function: normal    Range of motion: normal    Patient tolerance:  Patient tolerated the procedure well with no immediate complications        ED Course  ED Course as of 06/08/24 1958   Sat Jun 08, 2024   1643 XR elbow 2 vw RIGHT  X-ray independently interpreted by me.  No fracture or dislocation. No Foreign bodies.        1643 XR shoulder 2+ views RIGHT  X-ray independently interpreted by me.  No fracture or dislocation. No Foreign bodies.        1643 XR ribs with pa chest min 3 views RIGHT  Chest xray independently interpreted by me.  No acute cardiopulmonary disease. No subdiaphragmatic free air. No fracture or  dislocation       1644 XR forearm 2 views RIGHT(!)  X-ray independently interpreted by me.  Acute minimally displaced distal ulnar fracture dislocation                               SBIRT 22yo+      Flowsheet Row Most Recent Value   Initial Alcohol Screen: US AUDIT-C     1. How often do you have a drink containing alcohol? 0 Filed at: 06/08/2024 1515   2. How many drinks containing alcohol do you have on a typical day you are drinking?  0 Filed at: 06/08/2024 1515   3b. FEMALE Any Age, or MALE 65+: How often do you have 4 or more drinks on one occassion? 0 Filed at: 06/08/2024 1515   Audit-C Score 0 Filed at: 06/08/2024 1515   JOANNE: How many times in the past year have you...    Used an illegal drug or used a prescription medication for non-medical reasons? Never Filed at: 06/08/2024 1515                  Medical Decision Making  Initial Impression: Most significant injury appears to be the right wrist.  Also having pain in right shoulder and right lower ribs though no obvious deformity in those areas.  Low suspicion for elbow injury though will x-ray for completion.  Will address pain with Tylenol and IM Toradol for now    Final Impression: X-rays show minimally displaced distal ulnar fracture.  Right arm placed in a sugar-tong as above.  Patient was discharged in good condition.  Already has an orthopedist that she follows with for other issues and will follow-up with them.      Problems Addressed:  Other closed fracture of distal end of right ulna, initial encounter: complicated acute illness or injury that poses a threat to life or bodily functions    Amount and/or Complexity of Data Reviewed  Radiology: ordered and independent interpretation performed. Decision-making details documented in ED Course.    Risk  OTC drugs.  Prescription drug management.          Disposition  Final diagnoses:   Other closed fracture of distal end of right ulna, initial encounter     Time reflects when diagnosis was documented in  both MDM as applicable and the Disposition within this note       Time User Action Codes Description Comment    6/8/2024  4:55 PM Enrique Aragon Add [S59.378Y] Other closed fracture of distal end of right ulna, initial encounter           ED Disposition       ED Disposition   Discharge    Condition   Stable    Date/Time   Sat Jun 8, 2024 1478    Comment   Sheron Marquez discharge to home/self care.                   Follow-up Information       Follow up With Specialties Details Why Contact Info    Your orthopedist  Schedule an appointment as soon as possible for a visit               Discharge Medication List as of 6/8/2024  4:57 PM        CONTINUE these medications which have NOT CHANGED    Details   amLODIPine-benazepril (LOTREL) 10-20 MG per capsule Take 1 capsule by mouth daily, Starting Tue 4/16/2024, Normal      colesevelam (WELCHOL) 625 mg tablet Take 2 tablets (1,250 mg total) by mouth 2 (two) times a day with meals, Starting Tue 4/16/2024, Normal      diclofenac (VOLTAREN) 75 mg EC tablet Take 1 tablet (75 mg total) by mouth 2 (two) times a day, Starting Tue 4/16/2024, Normal      Diclofenac Sodium (VOLTAREN) 1 % Apply 2 g topically 4 (four) times a day, Starting Tue 10/10/2023, Normal      hydroCHLOROthiazide 50 mg tablet Take 1 tablet (50 mg total) by mouth daily, Starting Tue 4/16/2024, Normal      levothyroxine 50 mcg tablet Take 1 tablet (50 mcg total) by mouth daily, Starting Tue 4/16/2024, Normal      meclizine (ANTIVERT) 25 mg tablet Take 1 tablet (25 mg total) by mouth every 8 (eight) hours, Starting Fri 4/8/2022, Normal      NON FORMULARY Calcium 333mg-Magnesium 133mg-Zinc 200IU; one tablet BID, Historical Med      POTASSIUM PO Take by mouth Potassium 99mg, take 2 tablets daily, Historical Med           No discharge procedures on file.    PDMP Review       None             ED Provider  Attending physically available and evaluated Sheron Marquez. I managed the patient along with the ED  Attending.    Electronically Signed by           Enrique Aragon MD  06/08/24 1958

## 2024-06-08 NOTE — Clinical Note
Sheron Marquez was seen and treated in our emergency department on 6/8/2024.                Diagnosis:     Sheron  .    She may return on this date:     Your brother has to mow your lawn. Doctor's orders     If you have any questions or concerns, please don't hesitate to call.      Enrique Aragon MD    ______________________________           _______________          _______________  Hospital Representative                              Date                                Time

## 2024-06-08 NOTE — DISCHARGE INSTRUCTIONS
Rest and elevate the affected painful area.  Apply cold compresses intermittently as needed.      You should not carry anything in your right arm until you are seen by your orthopedist

## 2024-06-08 NOTE — ED ATTENDING ATTESTATION
Final Diagnosis:  1. Other closed fracture of distal end of right ulna, initial encounter           I, Mart Dia MD, saw and evaluated the patient. All available labs and X-rays were ordered by me or the resident and have been reviewed by myself. I discussed the patient with the resident / non-physician and agree with the resident's / non-physician practitioner's findings and plan as documented in the resident's / non-physician practicitioner's note, except where noted.   At this point, I agree with the current assessment done in the ED.   I was present during key portions of all procedures performed unless otherwise stated.     Chief Complaint   Patient presents with    Arm Injury     Mowing her grass with ridding , struck right arm on stairs, pain in right arm and ribs      This is a 68 y.o. female presenting for evaluation of right arm pain.  Patient states that she was driving on a riding lawnmower when she struck her right arm against the stairs and believes she may have also struck the right side of her chest.  She then finished mowing her lawn and then came in for further evaluation.  She identifies her pain as being on the distal, ulnar aspect of her right forearm.  This worse with palpation and movement.  Better at rest.  She also states that she has some generalized right-sided chest pain.  No shortness of breath.  No nausea or vomiting.    PMH:   has no past medical history on file.    PSH:   has a past surgical history that includes Total vaginal hysterectomy (2003).    Procedures     Social:  Social History     Substance and Sexual Activity   Alcohol Use Never     Social History     Tobacco Use   Smoking Status Every Day    Current packs/day: 0.75    Average packs/day: 0.8 packs/day for 32.0 years (24.0 ttl pk-yrs)    Types: Cigarettes   Smokeless Tobacco Never     Social History     Substance and Sexual Activity   Drug Use Never     PE:  Vitals:    06/08/24 1514   BP: (!) 186/89   BP  Location: Left arm   Pulse: 88   Resp: 18   Temp: 97.8 °F (36.6 °C)   TempSrc: Temporal   SpO2: 96%       A:    Unless otherwise specified above:     General: VS reviewed  Appears in NAD     Head: Normocephalic, atraumatic     CV: No pallor noted  Lungs:   No respiratory distress     Abdomen:  Soft, non-tender, non-distended     MSK:   No obvious deformity     Skin: No obvious rash.     Neuro: Awake, alert, GCS15, CN II-XII grossly intact. Speaking in full sentences.   Motor grossly intact.     Psychiatric/Behavioral: Appropriate mood and affect   Exam: deferred    P:  -Patient has slight deformity, swelling over distal ulna of right arm.  Very tender to palpation.  She is able to flex and exchange at elbow as well that shoulder without issue.  No pain with diffuse palpation over right-sided chest.  Concern for ulnar fracture.  Will provide x-rays.  Also x-ray chest for signs of pneumothorax, rib fractures though I think this to be less likely.  Analgesia.  - X-ray shows comminuted ulnar fracture overall peers to be in relatively good alignment.  I do not know that any significant reduction would be beneficial at this time.  Will place in splint and have follow-up with orthopedics.  No obvious fractures on chest x-ray.  Continue symptomatic management.  Return cautions reviewed.  - 13 point ROS was performed and all are normal unless stated in the history above.   - Nursing note reviewed. Vitals reviewed.   - Orders placed by myself and/or advanced practitioner / resident.    - Previous chart was reviewed  - No language barrier.   - History obtained from patient.   - There are no limitations to the history obtained.     Unless otherwise specified:  CC is exclusive from any separately billable procedures  CC is exclusive of treating other patients  CC is exclusive of teaching time     Code Status: No Order  Advance Directive and Living Will:      Power of :    POLST:      Medications   acetaminophen  (TYLENOL) tablet 975 mg (975 mg Oral Given 6/8/24 1536)   ketorolac (TORADOL) injection 15 mg (15 mg Intramuscular Given 6/8/24 1536)     XR wrist 3+ views RIGHT   ED Interpretation   Abnormal   Comminuted distal unla fracture      XR elbow 2 vw RIGHT   ED Interpretation   X-ray independently interpreted by me.  No fracture or dislocation. No Foreign bodies.         XR shoulder 2+ views RIGHT   ED Interpretation   X-ray independently interpreted by me.  No fracture or dislocation. No Foreign bodies.           XR ribs with pa chest min 3 views RIGHT   ED Interpretation   Chest xray independently interpreted by me.  No acute cardiopulmonary disease. No subdiaphragmatic free air. No fracture or dislocation        XR forearm 2 views RIGHT   ED Interpretation   Abnormal   X-ray independently interpreted by me.  Acute minimally displaced distal ulnar fracture dislocation        Orders Placed This Encounter   Procedures    XR wrist 3+ views RIGHT    XR elbow 2 vw RIGHT    XR shoulder 2+ views RIGHT    XR ribs with pa chest min 3 views RIGHT    XR forearm 2 views RIGHT    Application long arm splint     Labs Reviewed - No data to display  Time reflects when diagnosis was documented in both MDM as applicable and the Disposition within this note       Time User Action Codes Description Comment    6/8/2024  4:55 PM Enrique Aragon Add [S52.691A] Other closed fracture of distal end of right ulna, initial encounter           ED Disposition       ED Disposition   Discharge    Condition   Stable    Date/Time   Sat Jun 8, 2024  4:54 PM    Comment   Sheron Marquez discharge to home/self care.                   Follow-up Information       Follow up With Specialties Details Why Contact Info    Your orthopedist  Schedule an appointment as soon as possible for a visit             Patient's Medications   Discharge Prescriptions    OXYCODONE (ROXICODONE) 5 IMMEDIATE RELEASE TABLET    Take 1 tablet (5 mg total) by mouth every 6 (six)  "hours as needed for severe pain for up to 7 doses Max Daily Amount: 20 mg       Start Date: 6/8/2024  End Date: --       Order Dose: 5 mg       Quantity: 7 tablet    Refills: 0     No discharge procedures on file.  Prior to Admission Medications   Prescriptions Last Dose Informant Patient Reported? Taking?   Diclofenac Sodium (VOLTAREN) 1 %   No No   Sig: Apply 2 g topically 4 (four) times a day   NON FORMULARY   Yes No   Sig: Calcium 333mg-Magnesium 133mg-Zinc 200IU; one tablet BID   POTASSIUM PO   Yes No   Sig: Take by mouth Potassium 99mg, take 2 tablets daily   amLODIPine-benazepril (LOTREL) 10-20 MG per capsule   No No   Sig: Take 1 capsule by mouth daily   colesevelam (WELCHOL) 625 mg tablet   No No   Sig: Take 2 tablets (1,250 mg total) by mouth 2 (two) times a day with meals   diclofenac (VOLTAREN) 75 mg EC tablet   No No   Sig: Take 1 tablet (75 mg total) by mouth 2 (two) times a day   hydroCHLOROthiazide 50 mg tablet   No No   Sig: Take 1 tablet (50 mg total) by mouth daily   levothyroxine 50 mcg tablet   No No   Sig: Take 1 tablet (50 mcg total) by mouth daily   meclizine (ANTIVERT) 25 mg tablet   No No   Sig: Take 1 tablet (25 mg total) by mouth every 8 (eight) hours      Facility-Administered Medications: None       Portions of the record may have been created with voice recognition software. Occasional wrong word or \"sound a like\" substitutions may have occurred due to the inherent limitations of voice recognition software. Read the chart carefully and recognize, using context, where substitutions have occurred.    Electronically signed by:  Mart Dia    "

## 2024-08-26 ENCOUNTER — PREP FOR PROCEDURE (OUTPATIENT)
Age: 69
End: 2024-08-26

## 2024-08-26 ENCOUNTER — TELEPHONE (OUTPATIENT)
Age: 69
End: 2024-08-26

## 2024-08-26 DIAGNOSIS — Z12.11 SCREENING FOR COLON CANCER: Primary | ICD-10-CM

## 2024-08-26 NOTE — TELEPHONE ENCOUNTER
Scheduled date of colonoscopy (as of today):10/23/2024  Physician performing colonoscopy:Dr. Jaiyeola  Location of colonoscopy:[facility]  Bowel prep reviewed with patient:Domingo/Dul  Instructions reviewed with patient by:SUSAN-Domingo/Dul prep instructions sent via email to [email or web address]  Clearances: n/a

## 2024-08-26 NOTE — TELEPHONE ENCOUNTER
08/26/24  Screened by: Savanna Tirado    Referring Provider     Pre- Screening: Screening for colon cancer    There is no height or weight on file to calculate BMI.35.78  Has patient been referred for a routine screening Colonoscopy? yes  Is the patient between 45-75 years old? yes      Previous Colonoscopy yes   If yes:    Date: 6/8/2022    Facility: [facility]    Reason:  Personal history of colonic polyps        Does the patient want to see a Gastroenterologist prior to their procedure OR are they having any GI symptoms? NO    Has the patient been hospitalized or had abdominal surgery in the past 6 months? no    Does the patient use supplemental oxygen? no    Does the patient take Coumadin, Lovenox, Plavix, Elliquis, Xarelto, or other blood thinning medication? no    Has the patient had a stroke, cardiac event, or stent placed in the past year? no    If patient is between 45yrs - 49yrs, please advise patient that we will have to confirm benefits & coverage with their insurance company for a routine screening colonoscopy.

## 2024-10-08 ENCOUNTER — APPOINTMENT (OUTPATIENT)
Dept: LAB | Facility: HOSPITAL | Age: 69
End: 2024-10-08
Payer: MEDICARE

## 2024-10-08 DIAGNOSIS — I10 ESSENTIAL HYPERTENSION: ICD-10-CM

## 2024-10-08 DIAGNOSIS — E78.5 DYSLIPIDEMIA: ICD-10-CM

## 2024-10-08 DIAGNOSIS — R73.03 PRE-DIABETES: ICD-10-CM

## 2024-10-08 DIAGNOSIS — E55.9 VITAMIN D DEFICIENCY: ICD-10-CM

## 2024-10-08 DIAGNOSIS — E03.9 HYPOTHYROIDISM, UNSPECIFIED TYPE: ICD-10-CM

## 2024-10-08 LAB
25(OH)D3 SERPL-MCNC: 60.5 NG/ML (ref 30–100)
ALBUMIN SERPL BCG-MCNC: 4.1 G/DL (ref 3.5–5)
ALP SERPL-CCNC: 70 U/L (ref 34–104)
ALT SERPL W P-5'-P-CCNC: 12 U/L (ref 7–52)
ANION GAP SERPL CALCULATED.3IONS-SCNC: 8 MMOL/L (ref 4–13)
AST SERPL W P-5'-P-CCNC: 17 U/L (ref 13–39)
BASOPHILS # BLD AUTO: 0.06 THOUSANDS/ΜL (ref 0–0.1)
BASOPHILS NFR BLD AUTO: 1 % (ref 0–1)
BILIRUB SERPL-MCNC: 0.49 MG/DL (ref 0.2–1)
BUN SERPL-MCNC: 15 MG/DL (ref 5–25)
CALCIUM SERPL-MCNC: 9.6 MG/DL (ref 8.4–10.2)
CHLORIDE SERPL-SCNC: 96 MMOL/L (ref 96–108)
CHOLEST SERPL-MCNC: 239 MG/DL
CO2 SERPL-SCNC: 33 MMOL/L (ref 21–32)
CREAT SERPL-MCNC: 0.72 MG/DL (ref 0.6–1.3)
EOSINOPHIL # BLD AUTO: 0.41 THOUSAND/ΜL (ref 0–0.61)
EOSINOPHIL NFR BLD AUTO: 5 % (ref 0–6)
ERYTHROCYTE [DISTWIDTH] IN BLOOD BY AUTOMATED COUNT: 12.9 % (ref 11.6–15.1)
EST. AVERAGE GLUCOSE BLD GHB EST-MCNC: 117 MG/DL
GFR SERPL CREATININE-BSD FRML MDRD: 87 ML/MIN/1.73SQ M
GLUCOSE P FAST SERPL-MCNC: 93 MG/DL (ref 65–99)
HBA1C MFR BLD: 5.7 %
HCT VFR BLD AUTO: 44.8 % (ref 34.8–46.1)
HDLC SERPL-MCNC: 61 MG/DL
HGB BLD-MCNC: 14.4 G/DL (ref 11.5–15.4)
IMM GRANULOCYTES # BLD AUTO: 0.01 THOUSAND/UL (ref 0–0.2)
IMM GRANULOCYTES NFR BLD AUTO: 0 % (ref 0–2)
LDLC SERPL CALC-MCNC: 149 MG/DL (ref 0–100)
LYMPHOCYTES # BLD AUTO: 2.91 THOUSANDS/ΜL (ref 0.6–4.47)
LYMPHOCYTES NFR BLD AUTO: 38 % (ref 14–44)
MCH RBC QN AUTO: 29 PG (ref 26.8–34.3)
MCHC RBC AUTO-ENTMCNC: 32.1 G/DL (ref 31.4–37.4)
MCV RBC AUTO: 90 FL (ref 82–98)
MONOCYTES # BLD AUTO: 0.63 THOUSAND/ΜL (ref 0.17–1.22)
MONOCYTES NFR BLD AUTO: 8 % (ref 4–12)
NEUTROPHILS # BLD AUTO: 3.6 THOUSANDS/ΜL (ref 1.85–7.62)
NEUTS SEG NFR BLD AUTO: 48 % (ref 43–75)
NRBC BLD AUTO-RTO: 0 /100 WBCS
PLATELET # BLD AUTO: 330 THOUSANDS/UL (ref 149–390)
PMV BLD AUTO: 9.8 FL (ref 8.9–12.7)
POTASSIUM SERPL-SCNC: 3.5 MMOL/L (ref 3.5–5.3)
PROT SERPL-MCNC: 7 G/DL (ref 6.4–8.4)
RBC # BLD AUTO: 4.96 MILLION/UL (ref 3.81–5.12)
SODIUM SERPL-SCNC: 137 MMOL/L (ref 135–147)
TRIGL SERPL-MCNC: 143 MG/DL
TSH SERPL DL<=0.05 MIU/L-ACNC: 1.73 UIU/ML (ref 0.45–4.5)
WBC # BLD AUTO: 7.62 THOUSAND/UL (ref 4.31–10.16)

## 2024-10-08 PROCEDURE — 80053 COMPREHEN METABOLIC PANEL: CPT

## 2024-10-08 PROCEDURE — 80061 LIPID PANEL: CPT

## 2024-10-08 PROCEDURE — 83036 HEMOGLOBIN GLYCOSYLATED A1C: CPT

## 2024-10-08 PROCEDURE — 82306 VITAMIN D 25 HYDROXY: CPT

## 2024-10-08 PROCEDURE — 36415 COLL VENOUS BLD VENIPUNCTURE: CPT

## 2024-10-08 PROCEDURE — 84443 ASSAY THYROID STIM HORMONE: CPT

## 2024-10-08 PROCEDURE — 85025 COMPLETE CBC W/AUTO DIFF WBC: CPT

## 2024-10-16 ENCOUNTER — OFFICE VISIT (OUTPATIENT)
Dept: FAMILY MEDICINE CLINIC | Facility: CLINIC | Age: 69
End: 2024-10-16
Payer: MEDICARE

## 2024-10-16 VITALS
RESPIRATION RATE: 20 BRPM | BODY MASS INDEX: 35.26 KG/M2 | DIASTOLIC BLOOD PRESSURE: 76 MMHG | SYSTOLIC BLOOD PRESSURE: 130 MMHG | HEART RATE: 84 BPM | TEMPERATURE: 97.2 F | HEIGHT: 63 IN | WEIGHT: 199 LBS

## 2024-10-16 DIAGNOSIS — E55.9 VITAMIN D DEFICIENCY: ICD-10-CM

## 2024-10-16 DIAGNOSIS — E78.5 DYSLIPIDEMIA: ICD-10-CM

## 2024-10-16 DIAGNOSIS — E03.9 HYPOTHYROIDISM, UNSPECIFIED TYPE: ICD-10-CM

## 2024-10-16 DIAGNOSIS — I10 ESSENTIAL HYPERTENSION: ICD-10-CM

## 2024-10-16 DIAGNOSIS — Z00.00 MEDICARE ANNUAL WELLNESS VISIT, SUBSEQUENT: Primary | ICD-10-CM

## 2024-10-16 PROCEDURE — 99213 OFFICE O/P EST LOW 20 MIN: CPT | Performed by: FAMILY MEDICINE

## 2024-10-16 PROCEDURE — G0439 PPPS, SUBSEQ VISIT: HCPCS | Performed by: FAMILY MEDICINE

## 2024-10-16 RX ORDER — COLESEVELAM 180 1/1
1250 TABLET ORAL 2 TIMES DAILY WITH MEALS
Qty: 540 TABLET | Refills: 1 | Status: SHIPPED | OUTPATIENT
Start: 2024-10-16

## 2024-10-16 RX ORDER — AMLODIPINE AND BENAZEPRIL HYDROCHLORIDE 10; 20 MG/1; MG/1
1 CAPSULE ORAL DAILY
Qty: 90 CAPSULE | Refills: 1 | Status: SHIPPED | OUTPATIENT
Start: 2024-10-16

## 2024-10-16 RX ORDER — LEVOTHYROXINE SODIUM 50 UG/1
50 TABLET ORAL DAILY
Qty: 90 TABLET | Refills: 1 | Status: SHIPPED | OUTPATIENT
Start: 2024-10-16

## 2024-10-16 RX ORDER — HYDROCHLOROTHIAZIDE 50 MG/1
50 TABLET ORAL DAILY
Qty: 90 TABLET | Refills: 1 | Status: SHIPPED | OUTPATIENT
Start: 2024-10-16

## 2024-10-16 NOTE — PROGRESS NOTES
Ambulatory Visit  Name: Sheron Marquez      : 1955      MRN: [id number]  Encounter Provider: Adam Vázquez DO  Encounter Date: 10/16/2024   Encounter department: [facility] PRIMARY CARE    Assessment & Plan  Essential hypertension  The blood pressure is controlled on current regimen  Orders:    amLODIPine-benazepril (LOTREL) 10-20 MG per capsule; Take 1 capsule by mouth daily    hydroCHLOROthiazide 50 mg tablet; Take 1 tablet (50 mg total) by mouth daily    CBC and differential; Future    Comprehensive metabolic panel; Future    UA w Reflex to Microscopic w Reflex to Culture; Future    Dyslipidemia  Treated with WelChol the lipid panel shows a total cholesterol up to 239 triglycerides at 143 HDL of 61 and LDL at 149.  The patient has been lax in her diet due to the fact that she has had a nondisplaced radial fracture of the right arm and was unable to cook.  We will reassess a lipid panel in the near future.  In the meantime the patient will continue on WelChol  Orders:    colesevelam (WELCHOL) 625 mg tablet; Take 2 tablets (1,250 mg total) by mouth 2 (two) times a day with meals    Lipid Panel with Direct LDL reflex; Future    Hypothyroidism, unspecified type  Euthyroid on current replacement therapy  Orders:    levothyroxine 50 mcg tablet; Take 1 tablet (50 mcg total) by mouth daily    Vitamin D deficiency  We will reassess a vitamin D level  Orders:    Vitamin D 25 hydroxy; Future       Preventive health issues were discussed with patient, and age appropriate screening tests were ordered as noted in patient's After Visit Summary. Personalized health advice and appropriate referrals for health education or preventive services given if needed, as noted in patient's After Visit Summary.    History of Present Illness     Patient presents for 6-month checkup on hypertension hyperlipidemia and hypothyroidism and the Medicare annual wellness exam    Fall  Pertinent negatives include no  abdominal pain, fever, hematuria or vomiting.      Patient Care Team:  Adam Vázquez DO as PCP - General (Family Medicine)    Review of Systems   Constitutional:  Negative for chills and fever.   HENT:  Negative for ear pain and sore throat.    Eyes:  Negative for pain and visual disturbance.   Respiratory:  Negative for cough and shortness of breath.    Cardiovascular:  Negative for chest pain and palpitations.   Gastrointestinal:  Negative for abdominal pain and vomiting.   Genitourinary:  Negative for dysuria and hematuria.   Musculoskeletal:  Negative for arthralgias and back pain.   Skin:  Negative for color change and rash.   Neurological:  Negative for seizures and syncope.   All other systems reviewed and are negative.    Medical History Reviewed by provider this encounter:       Annual Wellness Visit Questionnaire       Health Risk Assessment:   Patient rates overall health as very good. Patient feels that their physical health rating is same. Patient is very satisfied with their life. Eyesight was rated as same. Hearing was rated as same. Patient feels that their emotional and mental health rating is same. Patients states they are never, rarely angry. Patient states they are never, rarely unusually tired/fatigued. Pain experienced in the last 7 days has been none. Patient states that she has experienced no weight loss or gain in last 6 months.     Depression Screening:   PHQ-2 Score: 0      Fall Risk Screening:   In the past year, patient has experienced: history of falling in past year    Number of falls: 1  Injured during fall?: Yes      Urinary Incontinence Screening:   Patient has not leaked urine accidently in the last six months.     Home Safety:  Patient does not have trouble with stairs inside or outside of their home. Patient has no working smoke alarms and has no working carbon monoxide detector. Home safety hazards include: none.     Nutrition:   Current diet is Regular.     Medications:    Patient is currently taking over-the-counter supplements. OTC medications include: see medication list. Patient is able to manage medications.     Activities of Daily Living (ADLs)/Instrumental Activities of Daily Living (IADLs):   Walk and transfer into and out of bed and chair?: Yes  Dress and groom yourself?: Yes    Bathe or shower yourself?: Yes    Feed yourself? Yes  Do your laundry/housekeeping?: Yes  Manage your money, pay your bills and track your expenses?: Yes  Make your own meals?: Yes    Do your own shopping?: Yes    Previous Hospitalizations:   Any hospitalizations or ED visits within the last 12 months?: Yes    How many hospitalizations have you had in the last year?: 1-2    Hospitalization Comments: I was not hospitalized. But zero is not an option.    Advance Care Planning:   Living will: No    Durable POA for healthcare: No    Advanced directive: No      PREVENTIVE SCREENINGS      Cardiovascular Screening:    General: Screening Current      Diabetes Screening:     General: Screening Current      Colorectal Cancer Screening:     General: Screening Current      Breast Cancer Screening:     General: Screening Current      Cervical Cancer Screening:    General: Screening Not Indicated      Lung Cancer Screening:     General: Screening Current      Hepatitis C Screening:    General: Screening Current    Screening, Brief Intervention, and Referral to Treatment (SBIRT)    Screening  Typical number of drinks in a day: 0  Typical number of drinks in a week: 0  Interpretation: Low risk drinking behavior.    AUDIT-C Screenin) How often did you have a drink containing alcohol in the past year? never  2) How many drinks did you have on a typical day when you were drinking in the past year? 0  3) How often did you have 6 or more drinks on one occasion in the past year? never    AUDIT-C Score: 0  Interpretation: Score 0-2 (female): Negative screen for alcohol misuse    Single Item Drug Screening:  How often  "have you used an illegal drug (including marijuana) or a prescription medication for non-medical reasons in the past year? never    Single Item Drug Screen Score: 0  Interpretation: Negative screen for possible drug use disorder    Social Determinants of Health     Financial Resource Strain: Low Risk  (10/3/2023)    Overall Financial Resource Strain (CARDIA)     Difficulty of Paying Living Expenses: Not hard at all   Food Insecurity: No Food Insecurity (10/11/2024)    Hunger Vital Sign     Worried About Running Out of Food in the Last Year: Never true     Ran Out of Food in the Last Year: Never true   Transportation Needs: No Transportation Needs (10/11/2024)    PRAPARE - Transportation     Lack of Transportation (Medical): No     Lack of Transportation (Non-Medical): No   Housing Stability: Low Risk  (10/11/2024)    Housing Stability Vital Sign     Unable to Pay for Housing in the Last Year: No     Number of Times Moved in the Last Year: 0     Homeless in the Last Year: No   Utilities: Not At Risk (10/11/2024)    [facility] Utilities     Threatened with loss of utilities: No     No results found.    Objective     /76   Pulse 84   Temp (!) 97.2 °F (36.2 °C)   Resp 20   Ht 5' 3\" (1.6 m)   Wt 90.3 kg (199 lb)   BMI 35.25 kg/m²     Physical Exam  Constitutional:       Appearance: Normal appearance.   HENT:      Head: Normocephalic and atraumatic.      Right Ear: Tympanic membrane, ear canal and external ear normal.      Left Ear: Tympanic membrane, ear canal and external ear normal.      Nose: Nose normal.      Mouth/Throat:      Mouth: Mucous membranes are moist.      Pharynx: Oropharynx is clear.   Eyes:      Extraocular Movements: Extraocular movements intact.      Conjunctiva/sclera: Conjunctivae normal.      Pupils: Pupils are equal, round, and reactive to light.   Cardiovascular:      Rate and Rhythm: Normal rate and regular rhythm.      Pulses: Normal pulses.      Heart sounds: Normal heart sounds. "   Pulmonary:      Effort: Pulmonary effort is normal.      Breath sounds: Normal breath sounds.   Abdominal:      General: Abdomen is flat. Bowel sounds are normal.      Palpations: Abdomen is soft.   Musculoskeletal:         General: Normal range of motion.      Cervical back: Normal range of motion and neck supple.   Skin:     General: Skin is warm and dry.   Neurological:      General: No focal deficit present.      Mental Status: She is alert and oriented to person, place, and time.   Psychiatric:         Mood and Affect: Mood normal.         Behavior: Behavior normal.

## 2024-10-21 ENCOUNTER — TELEPHONE (OUTPATIENT)
Dept: GASTROENTEROLOGY | Facility: CLINIC | Age: 69
End: 2024-10-21

## 2024-10-21 NOTE — TELEPHONE ENCOUNTER
Pt called to r/s colonoscopy. Colonoscopy r/s for 11/27/24 with Dr. Jaiyeola in [facility].

## 2024-11-27 ENCOUNTER — ANESTHESIA EVENT (OUTPATIENT)
Dept: PERIOP | Facility: HOSPITAL | Age: 69
End: 2024-11-27
Payer: MEDICARE

## 2024-11-27 ENCOUNTER — HOSPITAL ENCOUNTER (OUTPATIENT)
Dept: PERIOP | Facility: HOSPITAL | Age: 69
Setting detail: OUTPATIENT SURGERY
Discharge: HOME | End: 2024-11-27
Attending: INTERNAL MEDICINE
Payer: MEDICARE

## 2024-11-27 ENCOUNTER — ANESTHESIA (OUTPATIENT)
Dept: PERIOP | Facility: HOSPITAL | Age: 69
End: 2024-11-27
Payer: MEDICARE

## 2024-11-27 VITALS
HEIGHT: 63 IN | SYSTOLIC BLOOD PRESSURE: 106 MMHG | TEMPERATURE: 96.7 F | RESPIRATION RATE: 18 BRPM | HEART RATE: 85 BPM | WEIGHT: 199 LBS | BODY MASS INDEX: 35.26 KG/M2 | OXYGEN SATURATION: 95 % | DIASTOLIC BLOOD PRESSURE: 60 MMHG

## 2024-11-27 DIAGNOSIS — Z12.11 SCREENING FOR COLON CANCER: ICD-10-CM

## 2024-11-27 PROCEDURE — 88305 TISSUE EXAM BY PATHOLOGIST: CPT | Performed by: STUDENT IN AN ORGANIZED HEALTH CARE EDUCATION/TRAINING PROGRAM

## 2024-11-27 PROCEDURE — 45380 COLONOSCOPY AND BIOPSY: CPT | Performed by: INTERNAL MEDICINE

## 2024-11-27 PROCEDURE — 45385 COLONOSCOPY W/LESION REMOVAL: CPT | Performed by: INTERNAL MEDICINE

## 2024-11-27 RX ORDER — ONDANSETRON 2 MG/ML
4 INJECTION INTRAMUSCULAR; INTRAVENOUS ONCE AS NEEDED
Status: DISCONTINUED | OUTPATIENT
Start: 2024-11-27 | End: 2024-12-01 | Stop reason: HOSPADM

## 2024-11-27 RX ORDER — PROPOFOL 10 MG/ML
INJECTION, EMULSION INTRAVENOUS AS NEEDED
Status: DISCONTINUED | OUTPATIENT
Start: 2024-11-27 | End: 2024-11-27

## 2024-11-27 RX ORDER — SODIUM CHLORIDE, SODIUM LACTATE, POTASSIUM CHLORIDE, CALCIUM CHLORIDE 600; 310; 30; 20 MG/100ML; MG/100ML; MG/100ML; MG/100ML
125 INJECTION, SOLUTION INTRAVENOUS CONTINUOUS
Status: DISCONTINUED | OUTPATIENT
Start: 2024-11-27 | End: 2024-12-01 | Stop reason: HOSPADM

## 2024-11-27 RX ORDER — EPHEDRINE SULFATE 50 MG/ML
INJECTION INTRAVENOUS AS NEEDED
Status: DISCONTINUED | OUTPATIENT
Start: 2024-11-27 | End: 2024-11-27

## 2024-11-27 RX ORDER — SODIUM CHLORIDE, SODIUM LACTATE, POTASSIUM CHLORIDE, CALCIUM CHLORIDE 600; 310; 30; 20 MG/100ML; MG/100ML; MG/100ML; MG/100ML
INJECTION, SOLUTION INTRAVENOUS CONTINUOUS PRN
Status: DISCONTINUED | OUTPATIENT
Start: 2024-11-27 | End: 2024-11-27

## 2024-11-27 RX ORDER — LIDOCAINE HYDROCHLORIDE 10 MG/ML
INJECTION, SOLUTION EPIDURAL; INFILTRATION; INTRACAUDAL; PERINEURAL AS NEEDED
Status: DISCONTINUED | OUTPATIENT
Start: 2024-11-27 | End: 2024-11-27

## 2024-11-27 RX ADMIN — PROPOFOL 70 MG: 10 INJECTION, EMULSION INTRAVENOUS at 13:57

## 2024-11-27 RX ADMIN — PROPOFOL 140 MCG/KG/MIN: 10 INJECTION, EMULSION INTRAVENOUS at 13:58

## 2024-11-27 RX ADMIN — LIDOCAINE HYDROCHLORIDE 50 MG: 10 INJECTION, SOLUTION EPIDURAL; INFILTRATION; INTRACAUDAL; PERINEURAL at 13:57

## 2024-11-27 RX ADMIN — PROPOFOL 50 MG: 10 INJECTION, EMULSION INTRAVENOUS at 14:02

## 2024-11-27 RX ADMIN — EPHEDRINE SULFATE 5 MG: 50 INJECTION, SOLUTION INTRAVENOUS at 14:14

## 2024-11-27 RX ADMIN — PROPOFOL 50 MG: 10 INJECTION, EMULSION INTRAVENOUS at 14:07

## 2024-11-27 RX ADMIN — SODIUM CHLORIDE, SODIUM LACTATE, POTASSIUM CHLORIDE, AND CALCIUM CHLORIDE: .6; .31; .03; .02 INJECTION, SOLUTION INTRAVENOUS at 13:54

## 2024-11-27 RX ADMIN — PROPOFOL 70 MG: 10 INJECTION, EMULSION INTRAVENOUS at 14:05

## 2024-11-27 NOTE — ANESTHESIA POSTPROCEDURE EVALUATION
Post-Op Assessment Note    CV Status:  Stable    Pain management: adequate       Mental Status:  Sleepy   Hydration Status:  Euvolemic   PONV Controlled:  Controlled   Airway Patency:  Patent     Post Op Vitals Reviewed: Yes    No anethesia notable event occurred.    Staff: CRNA           Last Filed PACU Vitals:  Vitals Value Taken Time   Temp 97.2    Pulse 91 11/27/24 1420   /76    Resp 19 11/27/24 1420   SpO2 94 % 11/27/24 1420   Vitals shown include unfiled device data.    Modified Nick:  No data recorded

## 2024-11-27 NOTE — H&P
H&P - Gastroenterology   Name: Sheron Marquez 68 y.o. female I MRN: [id number]  Unit/Bed#:  I Date of Admission: 11/27/2024   Date of Service: 11/27/2024 I Hospital Day: 0     Assessment & Plan   This is a 68 y.o. year old female here for colonoscopy, and she is stable and optimized for her procedure.    History of Present Illness    Sheron Marquez is a 68 y.o. year old female who presents for history of colon polyps    REVIEW OF SYSTEMS: Per the HPI, and otherwise unremarkable.    Historical Information   Past Medical History:   Diagnosis Date    Disease of thyroid gland     Hyperlipidemia     Hypertension      Past Surgical History:   Procedure Laterality Date    DILATION AND CURETTAGE OF UTERUS      HYSTERECTOMY      TONSILLECTOMY      TOTAL VAGINAL HYSTERECTOMY  2003     Social History     Tobacco Use    Smoking status: Every Day     Current packs/day: 0.75     Average packs/day: 0.8 packs/day for 32.0 years (24.0 ttl pk-yrs)     Types: Cigarettes    Smokeless tobacco: Never   Vaping Use    Vaping status: Never Used   Substance and Sexual Activity    Alcohol use: Not Currently    Drug use: Not Currently    Sexual activity: Not Currently     E-Cigarette/Vaping    E-Cigarette Use Never User      E-Cigarette/Vaping Substances     Family history non-contributory    Meds/Allergies     Current Outpatient Medications:     amLODIPine-benazepril (LOTREL) 10-20 MG per capsule    diclofenac (VOLTAREN) 75 mg EC tablet    hydroCHLOROthiazide 50 mg tablet    levothyroxine 50 mcg tablet    colesevelam (WELCHOL) 625 mg tablet    Diclofenac Sodium (VOLTAREN) 1 %    meclizine (ANTIVERT) 25 mg tablet    NON FORMULARY    POTASSIUM PO  No Known Allergies    Objective :  Temp:  [97.3 °F (36.3 °C)] 97.3 °F (36.3 °C)  HR:  [85] 85  BP: (143)/(67) 143/67  Resp:  [20] 20  SpO2:  [95 %] 95 %  O2 Device: None (Room air)    Physical Exam  Gen: NAD  Head: NCAT  CV: RRR  CHEST: Clear  ABD: soft, NT/ND  EXT: no edema

## 2024-11-27 NOTE — ANESTHESIA PREPROCEDURE EVALUATION
Procedure:  COLONOSCOPY    Relevant Problems   No relevant active problems        Physical Exam    Airway    Mallampati score: II  TM Distance: >3 FB  Neck ROM: full     Dental       Cardiovascular      Pulmonary      Other Findings  post-pubertal.      Anesthesia Plan  ASA Score- 2     Anesthesia Type- IV sedation with anesthesia with ASA Monitors.         Additional Monitors:     Airway Plan:            Plan Factors-Exercise tolerance (METS): >4 METS.    Chart reviewed.        Patient is not a current smoker.  Patient did not smoke on day of surgery.            Induction- intravenous.    Postoperative Plan-     Perioperative Resuscitation Plan - Level 1 - Full Code.       Informed Consent- Anesthetic plan and risks discussed with patient.  I personally reviewed this patient with the CRNA. Discussed and agreed on the Anesthesia Plan with the CRNA..      NPO appropriate. Discussed benefits/risks of monitored anesthetic care and discussed providing a dynamic level of mild to deep sedation. Risks include awareness, airway obstruction, aspiration which may necessitate conversion to general anesthesia. All questions answered. Patient understands and wishes to proceed.    Anesthesia plan and consent discussed with Sheron who expressed understanding and agreement. Risks/benefits and alternatives discussed with patient including possible PONV, sore throat, damage to teeth/lips/gums and possibility of rare anesthetic and surgical emergencies.

## 2024-12-02 ENCOUNTER — RESULTS FOLLOW-UP (OUTPATIENT)
Dept: GASTROENTEROLOGY | Facility: MEDICAL CENTER | Age: 69
End: 2024-12-02

## 2024-12-02 PROCEDURE — 88305 TISSUE EXAM BY PATHOLOGIST: CPT | Performed by: STUDENT IN AN ORGANIZED HEALTH CARE EDUCATION/TRAINING PROGRAM

## 2024-12-02 NOTE — RESULT ENCOUNTER NOTE
Please call the patient with the results    Please let her know that the polyps removed were all pre-cancerous.   The biopsy of where her prior larger polyp was also showed a possible precancerous polyp. I recommend repeating the colonoscopy in 6 months to take additional biopsies and if that colonoscopy is normal the following colonoscopy would be in 3 years.

## 2024-12-04 NOTE — TELEPHONE ENCOUNTER
Called the patient's primary and left a VM indicating that we are reaching out with non-urgent results and recommendations from their procedure and to call the office to discuss. Provided office callback number.     ----- Message from Diana Jaiyeola, MD sent at 12/2/2024  6:14 PM EST -----  Please call the patient with the results    Please let her know that the polyps removed were all pre-cancerous.   The biopsy of where her prior larger polyp was also showed a possible precancerous polyp. I recommend repeating the colonoscopy in 6 months to take additional biopsies and if that colonoscopy is normal the following colonoscopy would be in 3 years.

## 2024-12-04 NOTE — TELEPHONE ENCOUNTER
Patient returning call for results.  Discussed per doctors note.  Patient will wait for call to schedule new procedure.        Diana M Jaiyeola, MD to [facility] Regarding result: Tissue Exam     12/2/24  6:14 PM  Note     Please call the patient with the results     Please let her know that the polyps removed were all pre-cancerous.   The biopsy of where her prior larger polyp was also showed a possible precancerous polyp. I recommend repeating the colonoscopy in 6 months to take additional biopsies and if that colonoscopy is normal the following colonoscopy would be in 3 years.          TC to pt  Given circumstances surrounding COVID-19 recommend the up coming visit be postponed with pt's consent.   Pt agrees  Plan for visit 8/2020  [facility]

## 2025-02-26 ENCOUNTER — TELEPHONE (OUTPATIENT)
Age: 70
End: 2025-02-26

## 2025-02-26 NOTE — TELEPHONE ENCOUNTER
02/26/25  Screened by: Anna Murillo    Referring Provider 6m recall    Pre- Screening:     There is no height or weight on file to calculate BMI. BMI - 35.25  Has patient been referred for a routine screening Colonoscopy? yes  Is the patient between 45-75 years old? yes      Previous Colonoscopy yes   If yes:    Date: 11/27/2024    Facility: [facility]    Reason: Screening        Does the patient want to see a Gastroenterologist prior to their procedure OR are they having any GI symptoms? no    Has the patient been hospitalized or had abdominal surgery in the past 6 months? no    Does the patient use supplemental oxygen? no    Does the patient take Coumadin, Lovenox, Plavix, Elliquis, Xarelto, or other blood thinning medication? no    Has the patient had a stroke, cardiac event, or stent placed in the past year? no    SCHEDULING STAFF: If patient answers NO to above questions, then schedule procedure. If patient answers YES to above questions, then schedule office appointment.     If patient is between 45yrs - 49yrs, please advise patient that we will have to confirm benefits & coverage with their insurance company for a routine screening colonoscopy.

## 2025-02-26 NOTE — TELEPHONE ENCOUNTER
Scheduled date of colonoscopy (as of today):Wednesday 6/4/25  Physician performing colonoscopy:Dr. Jaiyeola  Location of colonoscopy:[facility]  Bowel prep reviewed with patient: Lisseth Wilkes  Instructions reviewed with patient by:FW - Sent via email  Clearances: N/A

## 2025-03-28 DIAGNOSIS — Z00.6 ENCOUNTER FOR EXAMINATION FOR NORMAL COMPARISON OR CONTROL IN CLINICAL RESEARCH PROGRAM: ICD-10-CM

## 2025-04-02 ENCOUNTER — APPOINTMENT (OUTPATIENT)
Dept: LAB | Facility: HOSPITAL | Age: 70
End: 2025-04-02
Payer: MEDICARE

## 2025-04-02 DIAGNOSIS — Z00.6 ENCOUNTER FOR EXAMINATION FOR NORMAL COMPARISON OR CONTROL IN CLINICAL RESEARCH PROGRAM: ICD-10-CM

## 2025-04-02 DIAGNOSIS — E55.9 VITAMIN D DEFICIENCY: ICD-10-CM

## 2025-04-02 DIAGNOSIS — E78.5 DYSLIPIDEMIA: ICD-10-CM

## 2025-04-02 DIAGNOSIS — I10 ESSENTIAL HYPERTENSION: ICD-10-CM

## 2025-04-02 LAB
25(OH)D3 SERPL-MCNC: 55 NG/ML (ref 30–100)
ALBUMIN SERPL BCG-MCNC: 4.4 G/DL (ref 3.5–5)
ALP SERPL-CCNC: 68 U/L (ref 34–104)
ALT SERPL W P-5'-P-CCNC: 14 U/L (ref 7–52)
ANION GAP SERPL CALCULATED.3IONS-SCNC: 9 MMOL/L (ref 4–13)
AST SERPL W P-5'-P-CCNC: 18 U/L (ref 13–39)
BACTERIA UR QL AUTO: ABNORMAL /HPF
BASOPHILS # BLD AUTO: 0.08 THOUSANDS/ÂΜL (ref 0–0.1)
BASOPHILS NFR BLD AUTO: 1 % (ref 0–1)
BILIRUB SERPL-MCNC: 0.6 MG/DL (ref 0.2–1)
BILIRUB UR QL STRIP: NEGATIVE
BUN SERPL-MCNC: 14 MG/DL (ref 5–25)
CALCIUM SERPL-MCNC: 9.8 MG/DL (ref 8.4–10.2)
CHLORIDE SERPL-SCNC: 98 MMOL/L (ref 96–108)
CLARITY UR: CLEAR
CO2 SERPL-SCNC: 31 MMOL/L (ref 21–32)
COLOR UR: ABNORMAL
CREAT SERPL-MCNC: 0.74 MG/DL (ref 0.6–1.3)
EOSINOPHIL # BLD AUTO: 0.4 THOUSAND/ÂΜL (ref 0–0.61)
EOSINOPHIL NFR BLD AUTO: 5 % (ref 0–6)
ERYTHROCYTE [DISTWIDTH] IN BLOOD BY AUTOMATED COUNT: 13.3 % (ref 11.6–15.1)
GFR SERPL CREATININE-BSD FRML MDRD: 84 ML/MIN/1.73SQ M
GLUCOSE P FAST SERPL-MCNC: 107 MG/DL (ref 65–99)
GLUCOSE UR STRIP-MCNC: NEGATIVE MG/DL
HCT VFR BLD AUTO: 44.2 % (ref 34.8–46.1)
HGB BLD-MCNC: 14.7 G/DL (ref 11.5–15.4)
HGB UR QL STRIP.AUTO: ABNORMAL
IMM GRANULOCYTES # BLD AUTO: 0.02 THOUSAND/UL (ref 0–0.2)
IMM GRANULOCYTES NFR BLD AUTO: 0 % (ref 0–2)
KETONES UR STRIP-MCNC: NEGATIVE MG/DL
LEUKOCYTE ESTERASE UR QL STRIP: ABNORMAL
LYMPHOCYTES # BLD AUTO: 2.44 THOUSANDS/ÂΜL (ref 0.6–4.47)
LYMPHOCYTES NFR BLD AUTO: 28 % (ref 14–44)
MCH RBC QN AUTO: 29.3 PG (ref 26.8–34.3)
MCHC RBC AUTO-ENTMCNC: 33.3 G/DL (ref 31.4–37.4)
MCV RBC AUTO: 88 FL (ref 82–98)
MONOCYTES # BLD AUTO: 0.89 THOUSAND/ÂΜL (ref 0.17–1.22)
MONOCYTES NFR BLD AUTO: 10 % (ref 4–12)
NEUTROPHILS # BLD AUTO: 4.89 THOUSANDS/ÂΜL (ref 1.85–7.62)
NEUTS SEG NFR BLD AUTO: 56 % (ref 43–75)
NITRITE UR QL STRIP: NEGATIVE
NON-SQ EPI CELLS URNS QL MICRO: ABNORMAL /HPF
NRBC BLD AUTO-RTO: 0 /100 WBCS
PH UR STRIP.AUTO: 7 [PH]
PLATELET # BLD AUTO: 301 THOUSANDS/UL (ref 149–390)
PMV BLD AUTO: 9.7 FL (ref 8.9–12.7)
POTASSIUM SERPL-SCNC: 3.3 MMOL/L (ref 3.5–5.3)
PROT SERPL-MCNC: 7.4 G/DL (ref 6.4–8.4)
PROT UR STRIP-MCNC: ABNORMAL MG/DL
RBC # BLD AUTO: 5.01 MILLION/UL (ref 3.81–5.12)
RBC #/AREA URNS AUTO: ABNORMAL /HPF
SODIUM SERPL-SCNC: 138 MMOL/L (ref 135–147)
SP GR UR STRIP.AUTO: 1.01 (ref 1–1.03)
UROBILINOGEN UR STRIP-ACNC: <2 MG/DL
WBC # BLD AUTO: 8.72 THOUSAND/UL (ref 4.31–10.16)
WBC #/AREA URNS AUTO: ABNORMAL /HPF

## 2025-04-02 PROCEDURE — 36415 COLL VENOUS BLD VENIPUNCTURE: CPT

## 2025-04-02 PROCEDURE — 82306 VITAMIN D 25 HYDROXY: CPT

## 2025-04-02 PROCEDURE — 80053 COMPREHEN METABOLIC PANEL: CPT

## 2025-04-02 PROCEDURE — 85025 COMPLETE CBC W/AUTO DIFF WBC: CPT

## 2025-04-02 PROCEDURE — 81001 URINALYSIS AUTO W/SCOPE: CPT

## 2025-04-02 PROCEDURE — 80061 LIPID PANEL: CPT

## 2025-04-03 LAB
CHOLEST SERPL-MCNC: 239 MG/DL (ref ?–200)
HDLC SERPL-MCNC: 74 MG/DL
LDLC SERPL CALC-MCNC: 136 MG/DL (ref 0–100)
TRIGL SERPL-MCNC: 145 MG/DL (ref ?–150)

## 2025-04-04 ENCOUNTER — RESULTS FOLLOW-UP (OUTPATIENT)
Dept: FAMILY MEDICINE CLINIC | Facility: CLINIC | Age: 70
End: 2025-04-04

## 2025-04-07 DIAGNOSIS — E03.9 HYPOTHYROIDISM, UNSPECIFIED TYPE: ICD-10-CM

## 2025-04-08 RX ORDER — LEVOTHYROXINE SODIUM 50 UG/1
50 TABLET ORAL DAILY
Qty: 90 TABLET | Refills: 1 | Status: SHIPPED | OUTPATIENT
Start: 2025-04-08

## 2025-04-15 ENCOUNTER — RA CDI HCC (OUTPATIENT)
Dept: OTHER | Facility: HOSPITAL | Age: 70
End: 2025-04-15

## 2025-04-15 LAB
APOB+LDLR+PCSK9 GENE MUT ANL BLD/T: NOT DETECTED
BRCA1+BRCA2 DEL+DUP + FULL MUT ANL BLD/T: NOT DETECTED
MLH1+MSH2+MSH6+PMS2 GN DEL+DUP+FUL M: NOT DETECTED

## 2025-04-16 ENCOUNTER — OFFICE VISIT (OUTPATIENT)
Dept: FAMILY MEDICINE CLINIC | Facility: CLINIC | Age: 70
End: 2025-04-16
Payer: MEDICARE

## 2025-04-16 VITALS
HEART RATE: 76 BPM | SYSTOLIC BLOOD PRESSURE: 134 MMHG | TEMPERATURE: 96.8 F | WEIGHT: 199 LBS | DIASTOLIC BLOOD PRESSURE: 68 MMHG | HEIGHT: 63 IN | BODY MASS INDEX: 35.26 KG/M2 | RESPIRATION RATE: 16 BRPM

## 2025-04-16 DIAGNOSIS — R73.03 PRE-DIABETES: ICD-10-CM

## 2025-04-16 DIAGNOSIS — E66.812 CLASS 2 SEVERE OBESITY DUE TO EXCESS CALORIES WITH SERIOUS COMORBIDITY AND BODY MASS INDEX (BMI) OF 35.0 TO 35.9 IN ADULT (HCC): ICD-10-CM

## 2025-04-16 DIAGNOSIS — Z80.52 FAMILY HISTORY OF BLADDER CANCER: ICD-10-CM

## 2025-04-16 DIAGNOSIS — E55.9 VITAMIN D DEFICIENCY: ICD-10-CM

## 2025-04-16 DIAGNOSIS — E78.5 DYSLIPIDEMIA: ICD-10-CM

## 2025-04-16 DIAGNOSIS — E87.6 HYPOKALEMIA: ICD-10-CM

## 2025-04-16 DIAGNOSIS — R31.29 MICROSCOPIC HEMATURIA: ICD-10-CM

## 2025-04-16 DIAGNOSIS — C05.1 MALIGNANT NEOPLASM OF SOFT PALATE (HCC): ICD-10-CM

## 2025-04-16 DIAGNOSIS — E66.01 CLASS 2 SEVERE OBESITY DUE TO EXCESS CALORIES WITH SERIOUS COMORBIDITY AND BODY MASS INDEX (BMI) OF 35.0 TO 35.9 IN ADULT (HCC): ICD-10-CM

## 2025-04-16 DIAGNOSIS — Z00.00 MEDICARE ANNUAL WELLNESS VISIT, SUBSEQUENT: Primary | ICD-10-CM

## 2025-04-16 DIAGNOSIS — F17.210 SMOKING GREATER THAN 20 PACK YEARS: ICD-10-CM

## 2025-04-16 DIAGNOSIS — I10 ESSENTIAL HYPERTENSION: ICD-10-CM

## 2025-04-16 DIAGNOSIS — I73.9 CLAUDICATION OF RIGHT LOWER EXTREMITY (HCC): ICD-10-CM

## 2025-04-16 DIAGNOSIS — Z86.0100 HISTORY OF COLON POLYPS: ICD-10-CM

## 2025-04-16 PROCEDURE — 99214 OFFICE O/P EST MOD 30 MIN: CPT | Performed by: FAMILY MEDICINE

## 2025-04-16 PROCEDURE — G0438 PPPS, INITIAL VISIT: HCPCS | Performed by: FAMILY MEDICINE

## 2025-04-16 RX ORDER — HYDROCHLOROTHIAZIDE 50 MG/1
25 TABLET ORAL DAILY
Qty: 90 TABLET | Refills: 1 | Status: SHIPPED | OUTPATIENT
Start: 2025-04-16

## 2025-04-16 NOTE — PATIENT INSTRUCTIONS
Medicare Preventive Visit Patient Instructions  Thank you for completing your Welcome to Medicare Visit or Medicare Annual Wellness Visit today. Your next wellness visit will be due in one year (4/17/2026).  The screening/preventive services that you may require over the next 5-10 years are detailed below. Some tests may not apply to you based off risk factors and/or age. Screening tests ordered at today's visit but not completed yet may show as past due. Also, please note that scanned in results may not display below.  Preventive Screenings:  Service Recommendations Previous Testing/Comments   Colorectal Cancer Screening  * Colonoscopy    * Fecal Occult Blood Test (FOBT)/Fecal Immunochemical Test (FIT)  * Fecal DNA/Cologuard Test  * Flexible Sigmoidoscopy Age: 45-75 years old   Colonoscopy: every 10 years (may be performed more frequently if at higher risk)  OR  FOBT/FIT: every 1 year  OR  Cologuard: every 3 years  OR  Sigmoidoscopy: every 5 years  Screening may be recommended earlier than age 45 if at higher risk for colorectal cancer. Also, an individualized decision between you and your healthcare provider will decide whether screening between the ages of 76-85 would be appropriate. Colonoscopy: 11/27/2024  FOBT/FIT: Not on file  Cologuard: Not on file  Sigmoidoscopy: Not on file    Screening Current     Breast Cancer Screening Age: 40+ years old  Frequency: every 1-2 years  Not required if history of left and right mastectomy Mammogram: 07/29/2024    Screening Current   Cervical Cancer Screening Between the ages of 21-29, pap smear recommended once every 3 years.   Between the ages of 30-65, can perform pap smear with HPV co-testing every 5 years.   Recommendations may differ for women with a history of total hysterectomy, cervical cancer, or abnormal pap smears in past. Pap Smear: Not on file    Screening Not Indicated   Hepatitis C Screening Once for adults born between 1945 and 1965  More frequently in  patients at high risk for Hepatitis C Hep C Antibody: 03/29/2022    Screening Current   Diabetes Screening 1-2 times per year if you're at risk for diabetes or have pre-diabetes Fasting glucose: 107 mg/dL (4/2/2025)  A1C: 5.7 % (10/8/2024)  Screening Current   Cholesterol Screening Once every 5 years if you don't have a lipid disorder. May order more often based on risk factors. Lipid panel: 04/02/2025    Screening Current     Other Preventive Screenings Covered by Medicare:  Abdominal Aortic Aneurysm (AAA) Screening: covered once if your at risk. You're considered to be at risk if you have a family history of AAA.  Lung Cancer Screening: covers low dose CT scan once per year if you meet all of the following conditions: (1) Age 55-77; (2) No signs or symptoms of lung cancer; (3) Current smoker or have quit smoking within the last 15 years; (4) You have a tobacco smoking history of at least 20 pack years (packs per day multiplied by number of years you smoked); (5) You get a written order from a healthcare provider.  Glaucoma Screening: covered annually if you're considered high risk: (1) You have diabetes OR (2) Family history of glaucoma OR (3)  aged 50 and older OR (4)  American aged 65 and older  Osteoporosis Screening: covered every 2 years if you meet one of the following conditions: (1) You're estrogen deficient and at risk for osteoporosis based off medical history and other findings; (2) Have a vertebral abnormality; (3) On glucocorticoid therapy for more than 3 months; (4) Have primary hyperparathyroidism; (5) On osteoporosis medications and need to assess response to drug therapy.   Last bone density test (DXA Scan): 04/26/2023.  HIV Screening: covered annually if you're between the age of 15-65. Also covered annually if you are younger than 15 and older than 65 with risk factors for HIV infection. For pregnant patients, it is covered up to 3 times per  pregnancy.    Immunizations:  Immunization Recommendations   Influenza Vaccine Annual influenza vaccination during flu season is recommended for all persons aged >= 6 months who do not have contraindications   Pneumococcal Vaccine   * Pneumococcal conjugate vaccine = PCV13 (Prevnar 13), PCV15 (Vaxneuvance), PCV20 (Prevnar 20)  * Pneumococcal polysaccharide vaccine = PPSV23 (Pneumovax) Adults 19-65 yo with certain risk factors or if 65+ yo  If never received any pneumonia vaccine: recommend Prevnar 20 (PCV20)  Give PCV20 if previously received 1 dose of PCV13 or PPSV23   Hepatitis B Vaccine 3 dose series if at intermediate or high risk (ex: diabetes, end stage renal disease, liver disease)   Respiratory syncytial virus (RSV) Vaccine - COVERED BY MEDICARE PART D  * RSVPreF3 (Arexvy) CDC recommends that adults 60 years of age and older may receive a single dose of RSV vaccine using shared clinical decision-making (SCDM)   Tetanus (Td) Vaccine - COST NOT COVERED BY MEDICARE PART B Following completion of primary series, a booster dose should be given every 10 years to maintain immunity against tetanus. Td may also be given as tetanus wound prophylaxis.   Tdap Vaccine - COST NOT COVERED BY MEDICARE PART B Recommended at least once for all adults. For pregnant patients, recommended with each pregnancy.   Shingles Vaccine (Shingrix) - COST NOT COVERED BY MEDICARE PART B  2 shot series recommended in those 19 years and older who have or will have weakened immune systems or those 50 years and older     Health Maintenance Due:      Topic Date Due   • Lung Cancer Screening  11/22/2024   • Colorectal Cancer Screening  05/26/2025   • Breast Cancer Screening: Mammogram  07/29/2025   • Hepatitis C Screening  Completed     Immunizations Due:      Topic Date Due   • Pneumococcal Vaccine: 65+ Years (1 of 2 - PCV) Never done     Advance Directives   What are advance directives?  Advance directives are legal documents that state your  wishes and plans for medical care. These plans are made ahead of time in case you lose your ability to make decisions for yourself. Advance directives can apply to any medical decision, such as the treatments you want, and if you want to donate organs.   What are the types of advance directives?  There are many types of advance directives, and each state has rules about how to use them. You may choose a combination of any of the following:  Living will:  This is a written record of the treatment you want. You can also choose which treatments you do not want, which to limit, and which to stop at a certain time. This includes surgery, medicine, IV fluid, and tube feedings.   Durable power of  for healthcare (DPAHC):  This is a written record that states who you want to make healthcare choices for you when you are unable to make them for yourself. This person, called a proxy, is usually a family member or a friend. You may choose more than 1 proxy.  Do not resuscitate (DNR) order:  A DNR order is used in case your heart stops beating or you stop breathing. It is a request not to have certain forms of treatment, such as CPR. A DNR order may be included in other types of advance directives.  Medical directive:  This covers the care that you want if you are in a coma, near death, or unable to make decisions for yourself. You can list the treatments you want for each condition. Treatment may include pain medicine, surgery, blood transfusions, dialysis, IV or tube feedings, and a ventilator (breathing machine).  Values history:  This document has questions about your views, beliefs, and how you feel and think about life. This information can help others choose the care that you would choose.  Why are advance directives important?  An advance directive helps you control your care. Although spoken wishes may be used, it is better to have your wishes written down. Spoken wishes can be misunderstood, or not followed.  Treatments may be given even if you do not want them. An advance directive may make it easier for your family to make difficult choices about your care.   Cigarette Smoking and Your Health   Risks to your health if you smoke:  Nicotine and other chemicals found in tobacco damage every cell in your body. Even if you are a light smoker, you have an increased risk for cancer, heart disease, and lung disease. If you are pregnant or have diabetes, smoking increases your risk for complications.   Benefits to your health if you stop smoking:   You decrease respiratory symptoms such as coughing, wheezing, and shortness of breath.   You reduce your risk for cancers of the lung, mouth, throat, kidney, bladder, pancreas, stomach, and cervix. If you already have cancer, you increase the benefits of chemotherapy. You also reduce your risk for cancer returning or a second cancer from developing.   You reduce your risk for heart disease, blood clots, heart attack, and stroke.   You reduce your risk for lung infections, and diseases such as pneumonia, asthma, chronic bronchitis, and emphysema.  Your circulation improves. More oxygen can be delivered to your body. If you have diabetes, you lower your risk for complications, such as kidney, artery, and eye diseases. You also lower your risk for nerve damage. Nerve damage can lead to amputations, poor vision, and blindness.  You improve your body's ability to heal and to fight infections.  For more information and support to stop smoking:   [employer]  Phone: 3- 614 - 765-8252  Web Address: www.[employer]  Weight Management   Why it is important to manage your weight:  Being overweight increases your risk of health conditions such as heart disease, high blood pressure, type 2 diabetes, and certain types of cancer. It can also increase your risk for osteoarthritis, sleep apnea, and other respiratory problems. Aim for a slow, steady weight loss. Even a small amount of weight loss  can lower your risk of health problems.  How to lose weight safely:  A safe and healthy way to lose weight is to eat fewer calories and get regular exercise. You can lose up about 1 pound a week by decreasing the number of calories you eat by 500 calories each day.   Healthy meal plan for weight management:  A healthy meal plan includes a variety of foods, contains fewer calories, and helps you stay healthy. A healthy meal plan includes the following:  Eat whole-grain foods more often.  A healthy meal plan should contain fiber. Fiber is the part of grains, fruits, and vegetables that is not broken down by your body. Whole-grain foods are healthy and provide extra fiber in your diet. Some examples of whole-grain foods are whole-wheat breads and pastas, oatmeal, brown rice, and bulgur.  Eat a variety of vegetables every day.  Include dark, leafy greens such as spinach, kale, tom greens, and mustard greens. Eat yellow and orange vegetables such as carrots, sweet potatoes, and winter squash.   Eat a variety of fruits every day.  Choose fresh or canned fruit (canned in its own juice or light syrup) instead of juice. Fruit juice has very little or no fiber.  Eat low-fat dairy foods.  Drink fat-free (skim) milk or 1% milk. Eat fat-free yogurt and low-fat cottage cheese. Try low-fat cheeses such as mozzarella and other reduced-fat cheeses.  Choose meat and other protein foods that are low in fat.  Choose beans or other legumes such as split peas or lentils. Choose fish, skinless poultry (chicken or turkey), or lean cuts of red meat (beef or pork). Before you cook meat or poultry, cut off any visible fat.   Use less fat and oil.  Try baking foods instead of frying them. Add less fat, such as margarine, sour cream, regular salad dressing and mayonnaise to foods. Eat fewer high-fat foods. Some examples of high-fat foods include french fries, doughnuts, ice cream, and cakes.  Eat fewer sweets.  Limit foods and drinks that  are high in sugar. This includes candy, cookies, regular soda, and sweetened drinks.  Exercise:  Exercise at least 30 minutes per day on most days of the week. Some examples of exercise include walking, biking, dancing, and swimming. You can also fit in more physical activity by taking the stairs instead of the elevator or parking farther away from stores. Ask your healthcare provider about the best exercise plan for you.      © Copyright [employer] 2018 Information is for End User's use only and may not be sold, redistributed or otherwise used for commercial purposes. All illustrations and images included in CareNotes® are the copyrighted property of [employer] or [employer]

## 2025-04-16 NOTE — PROGRESS NOTES
Name: Sheron Marquez      : 1955      MRN: [id number]  Encounter Provider: Adam Vázquez DO  Encounter Date: 2025   Encounter department: [facility]  :  Assessment & Plan  Medicare annual wellness visit, subsequent         Microscopic hematuria    Orders:  •  CBC and differential; Future  •  Comprehensive metabolic panel; Future  •  Ambulatory Referral to Urology; Future    Family history of bladder cancer  Will refer to urology for evaluation of microscopic hematuria with history of bladder cancer in her brother.  The patient is also a current smoker  Orders:  •  Ambulatory Referral to Urology; Future    Claudication of right lower extremity (HCC)  We will perform an arterial Doppler of the right lower extremity  Orders:  •  VAS ARTERIAL DUPLEX- LOWER LIMB BILATERAL; Future    Hypokalemia  Hypokalemia antihypertensive we will decrease hydrochlorothiazide to 25 mg daily and have an evaluation with nephrology  Orders:  •  Ambulatory Referral to Nephrology; Future    Essential hypertension  With a blood pressure controlled on the current regimen  Orders:  •  Ambulatory Referral to Nephrology; Future  •  hydroCHLOROthiazide 50 mg tablet; Take 0.5 tablets (25 mg total) by mouth daily  •  UA w Reflex to Microscopic w Reflex to Culture; Future    Smoking greater than 20 pack years    Orders:  •  CT lung screening program; Future  •  Ambulatory Referral to Urology; Future    Dyslipidemia  Currently on Zetia with a total cholesterol of 239 triglycerides of 145 HDL of 74 and LDL of 136.  The patient defers statin therapy       Pre-diabetes  Will evaluate a hemoglobin A1c  Orders:  •  Hemoglobin A1c (w/out EAG); Future    Malignant neoplasm of soft palate (HCC)  Currently in remission       History of colon polyps  Follows with gastroenterology       Vitamin D deficiency    Orders:  •  Vitamin D 25 hydroxy; Future    Class 2 severe obesity due to excess calories with serious  comorbidity and body mass index (BMI) of 35.0 to 35.9 in adult ([facility])           Depression Screening and Follow-up Plan: Patient was screened for depression during today's encounter. They screened negative with a PHQ-2 score of 0.        Preventive health issues were discussed with patient, and age appropriate screening tests were ordered as noted in patient's After Visit Summary. Personalized health advice and appropriate referrals for health education or preventive services given if needed, as noted in patient's After Visit Summary.    History of Present Illness     Patient presents for Medicare annual wellness visit with complaint of fatigue.       Patient Care Team:  Adam Vázquez DO as PCP - General (Family Medicine)    Review of Systems   Constitutional:  Positive for fatigue. Negative for chills and fever.   HENT:  Negative for ear pain and sore throat.    Eyes:  Negative for pain and visual disturbance.   Respiratory:  Negative for cough and shortness of breath.    Cardiovascular:  Negative for chest pain and palpitations.   Gastrointestinal:  Negative for abdominal pain and vomiting.   Genitourinary:  Negative for dysuria and hematuria.   Musculoskeletal:  Negative for arthralgias and back pain.        Intermittent right anterior tibialis pain   Skin:  Negative for color change and rash.   Neurological:  Negative for seizures and syncope.   All other systems reviewed and are negative.    Medical History Reviewed by provider this encounter:  Tobacco  Allergies  Meds  Problems  Med Hx  Surg Hx  Fam Hx       Annual Wellness Visit Questionnaire       Health Risk Assessment:   Patient rates overall health as very good. Patient feels that their physical health rating is same. Patient is satisfied with their life. Eyesight was rated as same. Hearing was rated as same. Patient feels that their emotional and mental health rating is same. Patients states they are never, rarely angry. Patient states they are  sometimes unusually tired/fatigued. Pain experienced in the last 7 days has been some. Patient's pain rating has been 2/10. Patient states that she has experienced no weight loss or gain in last 6 months.     Depression Screening:   PHQ-2 Score: 0      Fall Risk Screening:   In the past year, patient has experienced: no history of falling in past year      Urinary Incontinence Screening:   Patient has not leaked urine accidently in the last six months.     Home Safety:  Patient does not have trouble with stairs inside or outside of their home. Patient has no working smoke alarms and has no working carbon monoxide detector. Home safety hazards include: none.     Nutrition:   Current diet is No Added Salt and Limited junk food.     Medications:   Patient is currently taking over-the-counter supplements. OTC medications include: see medication list. Patient is able to manage medications.     Activities of Daily Living (ADLs)/Instrumental Activities of Daily Living (IADLs):   Walk and transfer into and out of bed and chair?: Yes  Dress and groom yourself?: Yes    Bathe or shower yourself?: Yes    Feed yourself? Yes  Do your laundry/housekeeping?: Yes  Manage your money, pay your bills and track your expenses?: Yes  Make your own meals?: Yes    Do your own shopping?: Yes    Previous Hospitalizations:   Any hospitalizations or ED visits within the last 12 months?: Yes    How many hospitalizations have you had in the last year?: 1-2    Hospitalization Comments: Was not admitted, I broke my arm.    Advance Care Planning:   Living will: No    Durable POA for healthcare: No    Advanced directive: No      Preventive Screenings      Cardiovascular Screening:    General: Screening Current      Diabetes Screening:     General: Screening Current      Colorectal Cancer Screening:     General: Screening Current      Breast Cancer Screening:     General: Screening Current      Cervical Cancer Screening:    General: Screening Not  Indicated      Hepatitis C Screening:    General: Screening Current    Immunizations:  - Immunizations due: Prevnar 20 and Zoster (Shingrix)    Screening, Brief Intervention, and Referral to Treatment (SBIRT)     Screening  Typical number of drinks in a day: 0  Typical number of drinks in a week: 0  Interpretation: Low risk drinking behavior.    AUDIT-C Screenin) How often did you have a drink containing alcohol in the past year? never  2) How many drinks did you have on a typical day when you were drinking in the past year? 0  3) How often did you have 6 or more drinks on one occasion in the past year? never    AUDIT-C Score: 0  Interpretation: Score 0-2 (female): Negative screen for alcohol misuse    Single Item Drug Screening:  How often have you used an illegal drug (including marijuana) or a prescription medication for non-medical reasons in the past year? never    Single Item Drug Screen Score: 0  Interpretation: Negative screen for possible drug use disorder    Social Drivers of Health     Financial Resource Strain: Low Risk  (10/3/2023)    Overall Financial Resource Strain (CARDIA)    • Difficulty of Paying Living Expenses: Not hard at all   Food Insecurity: No Food Insecurity (2025)    Hunger Vital Sign    • Worried About Running Out of Food in the Last Year: Never true    • Ran Out of Food in the Last Year: Never true   Transportation Needs: No Transportation Needs (2025)    PRAPARE - Transportation    • Lack of Transportation (Medical): No    • Lack of Transportation (Non-Medical): No   Housing Stability: Low Risk  (2025)    Housing Stability Vital Sign    • Unable to Pay for Housing in the Last Year: No    • Number of Times Moved in the Last Year: 0    • Homeless in the Last Year: No   Utilities: Not At Risk (2025)    [facility] Utilities    • Threatened with loss of utilities: No     No results found.    Objective   /68   Pulse 76   Temp (!) 96.8 °F (36 °C)   Resp 16   Ht 5'  "3\" (1.6 m)   Wt 90.3 kg (199 lb)   BMI 35.25 kg/m²     Physical Exam  Constitutional:       Appearance: She is obese.   HENT:      Head: Normocephalic and atraumatic.      Right Ear: Tympanic membrane, ear canal and external ear normal.      Left Ear: Tympanic membrane, ear canal and external ear normal.      Nose: Nose normal.      Mouth/Throat:      Mouth: Mucous membranes are moist.      Pharynx: Oropharynx is clear.   Eyes:      Extraocular Movements: Extraocular movements intact.      Conjunctiva/sclera: Conjunctivae normal.      Pupils: Pupils are equal, round, and reactive to light.   Cardiovascular:      Rate and Rhythm: Normal rate and regular rhythm.      Pulses: Normal pulses.      Heart sounds: Normal heart sounds.   Pulmonary:      Effort: Pulmonary effort is normal.      Breath sounds: Normal breath sounds.   Abdominal:      General: Abdomen is flat. Bowel sounds are normal.      Palpations: Abdomen is soft.   Musculoskeletal:         General: Normal range of motion.      Cervical back: Normal range of motion and neck supple.   Skin:     General: Skin is warm and dry.   Neurological:      General: No focal deficit present.      Mental Status: She is alert and oriented to person, place, and time.   Psychiatric:         Mood and Affect: Mood normal.         Behavior: Behavior normal.       I discussed with her that she is a candidate for lung cancer CT screening.     The following Shared Decision-Making points were covered:  Benefits of screening were discussed, including the rates of reduction in death from lung cancer and other causes.  Harms of screening were reviewed, including false positive tests, radiation exposure levels, risks of invasive procedures, risks of complications of screening, and risk of overdiagnosis.  I counseled on the importance of adherence to annual lung cancer LDCT screening, impact of co-morbidities, and ability or willingness to undergo diagnosis and treatment.  I counseled " on the importance of maintaining abstinence as a former smoker or was counseled on the importance of smoking cessation if a current smoker    Review of Eligibility Criteria: She meets all of the criteria for Lung Cancer Screening.   She is 69 y.o.   She has 20 pack year tobacco history and is a current smoker or has quit within the past 15 years  She presents no signs or symptoms of lung cancer    After discussion, the patient decided to elect lung cancer screening.

## 2025-04-21 ENCOUNTER — RESULTS FOLLOW-UP (OUTPATIENT)
Dept: FAMILY MEDICINE CLINIC | Facility: CLINIC | Age: 70
End: 2025-04-21

## 2025-04-21 ENCOUNTER — HOSPITAL ENCOUNTER (OUTPATIENT)
Dept: NON INVASIVE DIAGNOSTICS | Facility: HOSPITAL | Age: 70
Discharge: HOME | End: 2025-04-21
Payer: MEDICARE

## 2025-04-21 DIAGNOSIS — I73.9 CLAUDICATION OF RIGHT LOWER EXTREMITY (HCC): ICD-10-CM

## 2025-04-21 PROCEDURE — 93923 UPR/LXTR ART STDY 3+ LVLS: CPT

## 2025-04-21 PROCEDURE — 93925 LOWER EXTREMITY STUDY: CPT

## 2025-04-21 PROCEDURE — 93925 LOWER EXTREMITY STUDY: CPT | Performed by: SURGERY

## 2025-04-21 PROCEDURE — 93922 UPR/L XTREMITY ART 2 LEVELS: CPT | Performed by: SURGERY

## 2025-04-21 NOTE — TELEPHONE ENCOUNTER
Called patient relayed message below.  Patient scheduled for tomorrow to go over Doppler examination.

## 2025-04-21 NOTE — TELEPHONE ENCOUNTER
----- Message from Adam Vázquez DO sent at 4/21/2025 12:12 PM EDT -----  Please call the patient regarding her abnormal result.  Schedule recheck regarding her Doppler examination.

## 2025-04-22 ENCOUNTER — OFFICE VISIT (OUTPATIENT)
Dept: FAMILY MEDICINE CLINIC | Facility: CLINIC | Age: 70
End: 2025-04-22
Payer: MEDICARE

## 2025-04-22 VITALS
SYSTOLIC BLOOD PRESSURE: 126 MMHG | BODY MASS INDEX: 35.61 KG/M2 | HEART RATE: 76 BPM | WEIGHT: 201 LBS | RESPIRATION RATE: 16 BRPM | DIASTOLIC BLOOD PRESSURE: 76 MMHG | TEMPERATURE: 98.4 F | HEIGHT: 63 IN

## 2025-04-22 DIAGNOSIS — F17.210 SMOKING GREATER THAN 20 PACK YEARS: ICD-10-CM

## 2025-04-22 DIAGNOSIS — I73.9 CLAUDICATION (HCC): Primary | ICD-10-CM

## 2025-04-22 DIAGNOSIS — I73.9 PERIPHERAL VASCULAR DISEASE (HCC): ICD-10-CM

## 2025-04-22 PROCEDURE — G2211 COMPLEX E/M VISIT ADD ON: HCPCS | Performed by: FAMILY MEDICINE

## 2025-04-22 PROCEDURE — 99213 OFFICE O/P EST LOW 20 MIN: CPT | Performed by: FAMILY MEDICINE

## 2025-04-22 RX ORDER — ATORVASTATIN CALCIUM 10 MG/1
10 TABLET, FILM COATED ORAL DAILY
Qty: 100 TABLET | Refills: 1 | Status: SHIPPED | OUTPATIENT
Start: 2025-04-22

## 2025-04-22 NOTE — PROGRESS NOTES
"Name: Sheron Marquez      : 1955      MRN: [id number]  Encounter Provider: Adam Vázquez DO  Encounter Date: 2025   Encounter department: [facility]  :  Assessment & Plan  Claudication (HCC)    Peripheral vascular disease (HCC)    Smoking greater than 20 pack years           History of Present Illness   HPI  Review of Systems    Objective   /76   Pulse 76   Temp 98.4 °F (36.9 °C)   Resp 16   Ht 5' 3\" (1.6 m)   Wt 91.2 kg (201 lb)   BMI 35.61 kg/m²      Physical Exam    "

## 2025-04-22 NOTE — PROGRESS NOTES
Name: Sheron Marquez      : 1955      MRN: [id number]  Encounter Provider: Adam Vázquez DO  Encounter Date: 2025   Encounter department: [facility]  :  Assessment & Plan  Claudication (HCC)  Cramping of the outer aspect of the right lower extremity  Orders:    atorvastatin (LIPITOR) 10 mg tablet; Take 1 tablet (10 mg total) by mouth daily    Ambulatory Referral to Vascular Surgery; Future    Peripheral vascular disease (HCC)  Will begin atorvastatin 10 mg will refer to vascular surgery for evaluation and the patient was encouraged to decrease her tobacco use  VAS ARTERIAL DUPLEX- LOWER LIMB BILATERAL  Status: Final result     PACS Images - GE     Show images for VAS ARTERIAL DUPLEX- LOWER LIMB BILATERAL  PACS Images - Sectra     Show images for VAS ARTERIAL DUPLEX- LOWER LIMB BILATERAL  Study Result    Result Text      [facility] REPORT  CLINICAL:  Indications:  Patient presents with pain in the lateral right calf with walking and at rest x  years.  Denies any open wounds or ulcers at this time.  Operative History:  No cardiovascular surgeries noted.  Risk Factors  The patient has history of HTN, Hyperlipidemia and smoking (current) 0.75 ppd.  Clinical  Right Pressure:  140/ mm Hg, Left Pressure:  149/ mm Hg.     FINDINGS:     Segment                Right                        Left                                            Impression  PSV (cm/s)  EDV  Impression  PSV (cm/s)  EDV    Post. Tibial           Normal                       Normal                         Common Femoral Artery                     109    0                      94    7    Prox Profunda                              74    0                      66    4    Prox SFA                                  156    0                     112    0    Mid SFA                                   148    0                     104    0    Dist SFA                                  114    6  50-75%              402   10    Proximal Pop                               56    0                      96    0    Distal Pop                                 47    1                      51    8    Tibioperoneal                              68                          106         Dist Post Tibial                           46    0                      61    5    Prox. Ant. Tibial                          72    5                                 Dist. Ant. Tibial                          28    0                      42    0             CONCLUSION:  Impression:  RIGHT LOWER LIMB:  Diffuse disease noted throughout the femoral-popliteal arteries without  significant focal stenosis.  Ankle/Brachial index:   0.97  which is in the normal category (Prior  none)  PVR/ PPG tracings are normal.  Metatarsal pressure of 108 mmHg  Great toe pressure of 75 mmHg, within the healing range     LEFT LOWER LIMB:  Diffuse disease noted throughout the femoral-popliteal arteries with  significant focal stenosis distal superficial femoral artery (50-75%-closer to  75%).  Ankle/Brachial index:   0.95 which is in the normal category (Prior none )  PVR/ PPG tracings are normal.  Metatarsal pressure of 132 mmHg  Great toe pressure of 78 mmHg, within the healing range     No prior exam on file.     SIGNATURE:  Electronically Signed by: JÚNIOR MARTÍNEZ on 2025-04-21 11:52:53 AM     Linked Documents    View Image   View Image     Imaging    VAS ARTERIAL DUPLEX- LOWER LIMB BILATERAL (Order: [id number]) - 4/21/2025    Result History    VAS ARTERIAL DUPLEX- LOWER LIMB BILATERAL (Order #[id number]) on 4/21/2025 - Order Result History Report    Order Report     Order Details  Order Questions    Question Answer   Exam reason Intermittent pain of the right anterior tibialis area with ambulation   Note: Enter reason for exam            Result Information    Status Priority Source   Final result (4/21/2025 11:52 AM) Routine      Reason for Exam    Intermittent pain of the  right anterior tibialis area with ambulation   Dx: Claudication of right lower extremity (HCC) [I73.9 (ICD-10-CM)]       All Reviewers List    Almita Rosales MA on 4/21/2025  3:16 PM   Adam Vázquez DO on 4/21/2025 12:12 PM         VAS ARTERIAL DUPLEX- LOWER LIMB BILATERAL: Patient Communication     Add Comments   Seen       Signed by    Signed Date/Time  Phone Pager   JÚNIOR MARTÍNEZ 4/21/2025 11:52 [phone]        Exam Information    Status Exam Begun  Exam Ended  Performing Tech   Final [99] 4/21/2025 10:10 4/21/2025 11:18 Christen Siegel       Questionnaire          Question Answer Comment   1. Reason for Exam: Intermittent pain of the right anterior tibialis area with ambulation          End Exam      PATIENT EDUCATION    Question Answer Comment   1. Was the patient educated? Yes    2. Why was the patient not educated?                External Results Report    Open External Results Report      Encounter    View Encounter                     Sedation Documentation Timeline (4/21/2025 00:00 to 4/21/2025 11:19)    An end event has not been filed for the most recent intervention. Due to this intervention’s length, all data may not appear below. To see all data, file an end event.  4/21/2025 4/21/2025 Event By   10:02:52 Orders Placed MK   Vascular Ultrasound  - VAS ARTERIAL DUPLEX- LOWER LIMB BILATERAL         10:15 Order Performed    VAS ARTERIAL DUPLEX- LOWER LIMB BILATERAL  - ID: [id number]           Pre-op Summary    Pre-op             Recovery Summary    Recovery                 Routing History    None     Orders:    atorvastatin (LIPITOR) 10 mg tablet; Take 1 tablet (10 mg total) by mouth daily    Ambulatory Referral to Vascular Surgery; Future    Smoking greater than 20 pack years                History of Present Illness   Patient presents to go over her vascular testing      Review of Systems   Constitutional:  Negative for chills and fever.   HENT:  Negative for ear pain and sore  "throat.    Eyes:  Negative for pain and visual disturbance.   Respiratory:  Negative for cough and shortness of breath.    Cardiovascular:  Negative for chest pain and palpitations.        Right outer calf pain with ambulation and rest   Gastrointestinal:  Negative for abdominal pain and vomiting.   Genitourinary:  Negative for dysuria and hematuria.   Musculoskeletal:  Negative for arthralgias and back pain.   Skin:  Negative for color change and rash.   Neurological:  Negative for seizures and syncope.   All other systems reviewed and are negative.      Objective   /76   Pulse 76   Temp 98.4 °F (36.9 °C)   Resp 16   Ht 5' 3\" (1.6 m)   Wt 91.2 kg (201 lb)   BMI 35.61 kg/m²      Physical Exam  Constitutional:       Appearance: Normal appearance.   HENT:      Head: Normocephalic and atraumatic.      Right Ear: Tympanic membrane, ear canal and external ear normal.      Left Ear: Tympanic membrane, ear canal and external ear normal.      Nose: Nose normal.      Mouth/Throat:      Mouth: Mucous membranes are moist.      Pharynx: Oropharynx is clear.   Eyes:      Extraocular Movements: Extraocular movements intact.      Conjunctiva/sclera: Conjunctivae normal.      Pupils: Pupils are equal, round, and reactive to light.   Cardiovascular:      Rate and Rhythm: Normal rate and regular rhythm.      Pulses: Normal pulses.      Heart sounds: Normal heart sounds.   Pulmonary:      Effort: Pulmonary effort is normal.      Breath sounds: Normal breath sounds.   Abdominal:      General: Abdomen is flat. Bowel sounds are normal.      Palpations: Abdomen is soft.   Musculoskeletal:         General: Normal range of motion.      Cervical back: Normal range of motion and neck supple.   Skin:     General: Skin is warm and dry.   Neurological:      General: No focal deficit present.      Mental Status: She is alert and oriented to person, place, and time.   Psychiatric:         Mood and Affect: Mood normal.         Behavior: " Behavior normal.

## 2025-04-27 ENCOUNTER — HOSPITAL ENCOUNTER (OUTPATIENT)
Dept: CT IMAGING | Facility: HOSPITAL | Age: 70
Discharge: HOME | End: 2025-04-27
Attending: FAMILY MEDICINE

## 2025-04-27 DIAGNOSIS — F17.210 SMOKING GREATER THAN 20 PACK YEARS: ICD-10-CM

## 2025-04-30 ENCOUNTER — OFFICE VISIT (OUTPATIENT)
Dept: UROLOGY | Facility: CLINIC | Age: 70
End: 2025-04-30
Payer: MEDICARE

## 2025-04-30 VITALS
TEMPERATURE: 98.1 F | BODY MASS INDEX: 35.44 KG/M2 | HEART RATE: 85 BPM | DIASTOLIC BLOOD PRESSURE: 86 MMHG | WEIGHT: 200 LBS | HEIGHT: 63 IN | OXYGEN SATURATION: 99 % | SYSTOLIC BLOOD PRESSURE: 132 MMHG

## 2025-04-30 DIAGNOSIS — Z80.52 FAMILY HISTORY OF BLADDER CANCER: ICD-10-CM

## 2025-04-30 DIAGNOSIS — R31.29 MICROSCOPIC HEMATURIA: ICD-10-CM

## 2025-04-30 DIAGNOSIS — F17.210 SMOKING GREATER THAN 20 PACK YEARS: ICD-10-CM

## 2025-04-30 LAB
SL AMB  POCT GLUCOSE, UA: NORMAL
SL AMB LEUKOCYTE ESTERASE,UA: NORMAL
SL AMB POCT BILIRUBIN,UA: NORMAL
SL AMB POCT BLOOD,UA: NORMAL
SL AMB POCT CLARITY,UA: CLEAR
SL AMB POCT COLOR,UA: YELLOW
SL AMB POCT KETONES,UA: NORMAL
SL AMB POCT NITRITE,UA: NORMAL
SL AMB POCT PH,UA: 7
SL AMB POCT SPECIFIC GRAVITY,UA: 1.01
SL AMB POCT URINE PROTEIN: NORMAL
SL AMB POCT UROBILINOGEN: 0.2

## 2025-04-30 PROCEDURE — 99204 OFFICE O/P NEW MOD 45 MIN: CPT

## 2025-04-30 PROCEDURE — 81002 URINALYSIS NONAUTO W/O SCOPE: CPT

## 2025-04-30 NOTE — PROGRESS NOTES
Name: Sheron Marquez      : 1955      MRN: [id number]  Encounter Provider: LIZZETTE Olivier  Encounter Date: 2025   Encounter department: [facility] [place]    :  Assessment & Plan  Microscopic hematuria  UA/micro from  showed 4-10 RBCs, 2-4 WBCs, moderate epithelial cells, occasional bacteria.   Strong family history of bladder cancer as well as prostate cancer in her brother.  She also is a current smoker of 50 or more years on average 1 pack/day.  I am recommending a CT renal protocol as well as cystoscopy for further evaluation.    Orders:    Ambulatory Referral to Urology    POCT urine dip    CT renal protocol; Future    Cystoscopy      Microscopic hematuria was discussed with the patient. We discussed the definition of microscopic hematuria, which is equal to or greater than 3 RBC per high-power field on microscopic evaluation of single properly collected urine specimen. We discussed the potential causes of microscopic hematuria which include but are not limited to: inflammation, urolithiasis, malignancy, and infection.    I explained that per AUA guidelines, patients with microscopic hematuria need to be risk stratified:          Based on risk factors, I explained that the patient is considered high risk.    I reviewed the table above regarding the recommended next steps.                       History of Present Illness     New patient to office with PMHx significant for dyslipidemia, malignant neoplasm of soft palate, obesity, prediabetes    Patient was referred by her PCP Dr. Adam Vázquez for evaluation of microscopic hematuria.  She was recently seen for her annual wellness visit and urine testing was positive for microscopic blood.  Microscopic analysis from 2025 showed 4-10 RBCs, 2-4 WBCs, moderate epithelial cells, occasional bacteria.  At baseline she denies any bothersome urinary symptoms no issues with recurrent UTIs, kidney stones, or gross hematuria in  the past.  Point-of-care urine testing today is negative for infection or microscopic blood.    She does report a strong family history of bladder cancer as well as prostate cancer in her brother who was diagnosed in his early 60s.  Her brother did smoke as well as had chemical exposure working with the [employer].     She is a current smoker as well over 50 years on average about 1 pack/day.  She is a retired .  Other than her brothers history she denies any other known family history of urinary tract malignancy.        Review of Systems   Constitutional:  Negative for chills and fever.   HENT:  Negative for ear pain and sore throat.    Eyes:  Negative for pain and visual disturbance.   Respiratory:  Negative for cough and shortness of breath.    Cardiovascular:  Negative for chest pain and palpitations.   Gastrointestinal:  Negative for abdominal pain and vomiting.   Genitourinary:  Negative for dysuria and hematuria.   Musculoskeletal:  Negative for arthralgias and back pain.   Skin:  Negative for color change and rash.   Neurological:  Negative for seizures and syncope.   All other systems reviewed and are negative.    Objective   There were no vitals taken for this visit.    Physical Exam  Vitals and nursing note reviewed.   Constitutional:       General: She is not in acute distress.     Appearance: She is well-developed.   HENT:      Head: Normocephalic and atraumatic.   Eyes:      Conjunctiva/sclera: Conjunctivae normal.   Cardiovascular:      Rate and Rhythm: Normal rate and regular rhythm.      Heart sounds: No murmur heard.  Pulmonary:      Effort: Pulmonary effort is normal. No respiratory distress.      Breath sounds: Normal breath sounds.   Abdominal:      Palpations: Abdomen is soft.      Tenderness: There is no abdominal tenderness.   Musculoskeletal:         General: No swelling.      Cervical back: Neck supple.   Skin:     General: Skin is warm and dry.      Capillary Refill: Capillary refill  takes less than 2 seconds.   Neurological:      Mental Status: She is alert.   Psychiatric:         Mood and Affect: Mood normal.           Imaging:          Please Note:  Voice dictation software has been used to create this document. There may be inadvertent transcriptions errors.     LIZZETTE Olivier 04/30/25

## 2025-05-02 NOTE — TELEPHONE ENCOUNTER
----- Message from Adam Vázquez DO sent at 5/2/2025  8:33 AM EDT -----  Please call patient with normal CT Lung screen - will repeat in one year

## 2025-05-08 DIAGNOSIS — I10 ESSENTIAL HYPERTENSION: ICD-10-CM

## 2025-05-09 RX ORDER — AMLODIPINE AND BENAZEPRIL HYDROCHLORIDE 10; 20 MG/1; MG/1
1 CAPSULE ORAL DAILY
Qty: 90 CAPSULE | Refills: 1 | Status: SHIPPED | OUTPATIENT
Start: 2025-05-09

## 2025-05-13 ENCOUNTER — HOSPITAL ENCOUNTER (OUTPATIENT)
Dept: CT IMAGING | Facility: HOSPITAL | Age: 70
Discharge: HOME | End: 2025-05-13
Payer: MEDICARE

## 2025-05-13 DIAGNOSIS — Z80.52 FAMILY HISTORY OF BLADDER CANCER: ICD-10-CM

## 2025-05-13 DIAGNOSIS — R31.29 MICROSCOPIC HEMATURIA: ICD-10-CM

## 2025-05-13 DIAGNOSIS — F17.210 SMOKING GREATER THAN 20 PACK YEARS: ICD-10-CM

## 2025-05-13 PROCEDURE — 74178 CT ABD&PLV WO CNTR FLWD CNTR: CPT

## 2025-05-13 RX ADMIN — IOHEXOL 100 ML: 350 INJECTION, SOLUTION INTRAVENOUS at 16:11

## 2025-05-15 DIAGNOSIS — E87.5 HYPERKALEMIA: Primary | ICD-10-CM

## 2025-05-20 ENCOUNTER — RESULTS FOLLOW-UP (OUTPATIENT)
Dept: UROLOGY | Facility: CLINIC | Age: 70
End: 2025-05-20

## 2025-05-20 NOTE — TELEPHONE ENCOUNTER
----- Message from LIZZETTE Yoo sent at 5/20/2025  2:05 PM EDT -----  Please let patient know that her CT renal protocol did not show any acute findings to account for her microscopic hematuria.  Both kidneys were normal aside from simple renal cyst bladder was also   normal.  Incidentally noted is a small left adrenal nodule which is benign and does not require further workup.  She should follow-up as scheduled for cystoscopy with Dr. Greer in July.  ----- Message -----  From: Sultana, Radiology Results In  Sent: 5/19/2025   3:59 PM EDT  To: LIZZETTE Olivier

## 2025-05-20 NOTE — RESULT ENCOUNTER NOTE
Please let patient know that her CT renal protocol did not show any acute findings to account for her microscopic hematuria.  Both kidneys were normal aside from simple renal cyst bladder was also normal.  Incidentally noted is a small left adrenal nodule which is benign and does not require further workup.  She should follow-up as scheduled for cystoscopy with Dr. Greer in July.

## 2025-05-20 NOTE — TELEPHONE ENCOUNTER
Left detailed message per communication consent that CT Renal Protocol did not show any acute findings to account for her microscopic hematuria.  Both kidneys were normal aside from simple renal cyst bladder was also normal.  Incidentally noted is a small left adrenal nodule which is benign and does not require further workup.  She should follow-up as scheduled for cystoscopy with Dr. Greer in July. Office number was provided if patient has any questions to call

## 2025-05-29 ENCOUNTER — ANESTHESIA EVENT (OUTPATIENT)
Dept: ANESTHESIOLOGY | Facility: HOSPITAL | Age: 70
End: 2025-05-29

## 2025-05-29 ENCOUNTER — ANESTHESIA (OUTPATIENT)
Dept: ANESTHESIOLOGY | Facility: HOSPITAL | Age: 70
End: 2025-05-29

## 2025-05-29 NOTE — ANESTHESIA PREPROCEDURE EVALUATION
Procedure:  PRE-OP ONLY    ECG: SR with PVCs    HTN - amlodipine, HCTZ       Physical Exam    Airway       Cardiovascular      Dental       Pulmonary      Neurological      Other Findings  post-pubertal.           NPO Status:  No vitals data found for the desired time range.

## 2025-06-04 ENCOUNTER — ANESTHESIA (OUTPATIENT)
Dept: PERIOP | Facility: HOSPITAL | Age: 70
End: 2025-06-04
Payer: MEDICARE

## 2025-06-04 ENCOUNTER — HOSPITAL ENCOUNTER (OUTPATIENT)
Dept: PERIOP | Facility: HOSPITAL | Age: 70
Setting detail: OUTPATIENT SURGERY
Discharge: HOME | End: 2025-06-04
Attending: INTERNAL MEDICINE
Payer: MEDICARE

## 2025-06-04 ENCOUNTER — ANESTHESIA EVENT (OUTPATIENT)
Dept: PERIOP | Facility: HOSPITAL | Age: 70
End: 2025-06-04
Payer: MEDICARE

## 2025-06-04 VITALS
OXYGEN SATURATION: 95 % | HEART RATE: 68 BPM | HEIGHT: 63 IN | SYSTOLIC BLOOD PRESSURE: 111 MMHG | TEMPERATURE: 97.9 F | DIASTOLIC BLOOD PRESSURE: 55 MMHG | BODY MASS INDEX: 35.44 KG/M2 | RESPIRATION RATE: 16 BRPM | WEIGHT: 200 LBS

## 2025-06-04 DIAGNOSIS — Z12.11 SCREENING FOR COLON CANCER: ICD-10-CM

## 2025-06-04 DIAGNOSIS — Z83.719 FAMILY HISTORY OF COLONIC POLYPS: ICD-10-CM

## 2025-06-04 PROCEDURE — 45385 COLONOSCOPY W/LESION REMOVAL: CPT | Performed by: INTERNAL MEDICINE

## 2025-06-04 PROCEDURE — 88305 TISSUE EXAM BY PATHOLOGIST: CPT | Performed by: PATHOLOGY

## 2025-06-04 RX ORDER — SODIUM CHLORIDE, SODIUM LACTATE, POTASSIUM CHLORIDE, CALCIUM CHLORIDE 600; 310; 30; 20 MG/100ML; MG/100ML; MG/100ML; MG/100ML
INJECTION, SOLUTION INTRAVENOUS CONTINUOUS PRN
Status: DISCONTINUED | OUTPATIENT
Start: 2025-06-04 | End: 2025-06-04

## 2025-06-04 RX ORDER — ONDANSETRON 2 MG/ML
4 INJECTION INTRAMUSCULAR; INTRAVENOUS ONCE AS NEEDED
Status: DISCONTINUED | OUTPATIENT
Start: 2025-06-04 | End: 2025-06-08 | Stop reason: HOSPADM

## 2025-06-04 RX ORDER — ALBUTEROL SULFATE 0.83 MG/ML
2.5 SOLUTION RESPIRATORY (INHALATION) ONCE AS NEEDED
Status: DISCONTINUED | OUTPATIENT
Start: 2025-06-04 | End: 2025-06-08 | Stop reason: HOSPADM

## 2025-06-04 RX ORDER — PROPOFOL 10 MG/ML
INJECTION, EMULSION INTRAVENOUS AS NEEDED
Status: DISCONTINUED | OUTPATIENT
Start: 2025-06-04 | End: 2025-06-04

## 2025-06-04 RX ORDER — LIDOCAINE HYDROCHLORIDE 10 MG/ML
INJECTION, SOLUTION EPIDURAL; INFILTRATION; INTRACAUDAL; PERINEURAL AS NEEDED
Status: DISCONTINUED | OUTPATIENT
Start: 2025-06-04 | End: 2025-06-04

## 2025-06-04 RX ADMIN — PROPOFOL 100 MCG/KG/MIN: 10 INJECTION, EMULSION INTRAVENOUS at 10:14

## 2025-06-04 RX ADMIN — SODIUM CHLORIDE, SODIUM LACTATE, POTASSIUM CHLORIDE, AND CALCIUM CHLORIDE: .6; .31; .03; .02 INJECTION, SOLUTION INTRAVENOUS at 10:08

## 2025-06-04 RX ADMIN — LIDOCAINE HYDROCHLORIDE 50 MG: 10 INJECTION, SOLUTION EPIDURAL; INFILTRATION; INTRACAUDAL; PERINEURAL at 10:12

## 2025-06-04 RX ADMIN — PROPOFOL 100 MG: 10 INJECTION, EMULSION INTRAVENOUS at 10:12

## 2025-06-04 NOTE — H&P
H&P - Gastroenterology   Name: Sheron Marquez 69 y.o. female I MRN: [id number]  Unit/Bed#:  I Date of Admission: 6/4/2025   Date of Service: 6/4/2025 I Hospital Day: 0     Assessment & Plan   This is a 69 y.o. year old female here for colonoscopy, and she is stable and optimized for her procedure.    History of Present Illness    Sheron Marquez is a 69 y.o. year old female who presents for history of colon polyps    REVIEW OF SYSTEMS: Per the HPI, and otherwise unremarkable.    Historical Information   Past Medical History[1]  Past Surgical History[2]  Social History[3]  E-Cigarette/Vaping    E-Cigarette Use Never User      E-Cigarette/Vaping Substances     Family history non-contributory    Meds/Allergies   Current Medications[4]  Allergies[5]    Objective :  Temp:  [97.5 °F (36.4 °C)] 97.5 °F (36.4 °C)  HR:  [97] 97  BP: (140)/(71) 140/71  Resp:  [18] 18  SpO2:  [97 %] 97 %  O2 Device: None (Room air)    Physical Exam  Gen: NAD  Head: NCAT  CV: RRR  CHEST: Clear  ABD: soft, NT/ND  EXT: no edema       [1]   Past Medical History:  Diagnosis Date    Disease of thyroid gland     Hyperlipidemia     Hypertension     Obesity Don’t know   [2]   Past Surgical History:  Procedure Laterality Date    BREAST SURGERY Right     right breast lumpectomy about 20 yrs ago    DILATION AND CURETTAGE OF UTERUS      HYSTERECTOMY      TONSILLECTOMY      TOTAL VAGINAL HYSTERECTOMY  2003   [3]   Social History  Tobacco Use    Smoking status: Every Day     Current packs/day: 0.75     Average packs/day: 0.8 packs/day for 32.0 years (24.0 ttl pk-yrs)     Types: Cigarettes    Smokeless tobacco: Never   Vaping Use    Vaping status: Never Used   Substance and Sexual Activity    Alcohol use: Not Currently    Drug use: Not Currently    Sexual activity: Not Currently   [4]   Current Outpatient Medications:     amLODIPine-benazepril (LOTREL) 10-20 MG per capsule    atorvastatin (LIPITOR) 10 mg tablet    colesevelam (WELCHOL) 625 mg tablet     diclofenac (VOLTAREN) 75 mg EC tablet    hydroCHLOROthiazide 50 mg tablet    levothyroxine 50 mcg tablet    NON FORMULARY    POTASSIUM PO    Diclofenac Sodium (VOLTAREN) 1 %    meclizine (ANTIVERT) 25 mg tablet  [5] No Known Allergies

## 2025-06-04 NOTE — ANESTHESIA POSTPROCEDURE EVALUATION
Post-Op Assessment Note    CV Status:  Stable  Pain Score: 0    Pain management: satisfactory to patient       Mental Status:  Alert and awake   Hydration Status:  Euvolemic   PONV Controlled:  Controlled   Airway Patency:  Patent     Post Op Vitals Reviewed: Yes    No anethesia notable event occurred.    Staff: CRNA           Last Filed PACU Vitals:  Vitals Value Taken Time   Temp 97.0    Pulse 84 06/04/25 10:35   /65    Resp 13 06/04/25 10:35   SpO2 98 % 06/04/25 10:35   Vitals shown include unfiled device data.

## 2025-06-04 NOTE — ANESTHESIA PREPROCEDURE EVALUATION
Procedure:  COLONOSCOPY  ECG: SR with PVCs     HTN - amlodipine, HCTZ     Denies the following: CP/SOB with exertion, asthma, COPD, THOMAS, stroke/TIA, seizure        Physical Exam    Airway     Mallampati score: II  TM Distance: >3 FB  Neck ROM: full  Mouth opening: >= 4 cm      Cardiovascular      Dental       Pulmonary      Neurological      Other Findings  post-pubertal.      Anesthesia Plan  ASA Score- 2     Anesthesia Type- IV sedation with anesthesia with ASA Monitors.         Additional Monitors:     Airway Plan:            Plan Factors-Exercise tolerance (METS): >4 METS.    Chart reviewed. EKG reviewed.  Existing labs reviewed. Patient summary reviewed.    Patient is not a current smoker.      Obstructive sleep apnea risk education given perioperatively.        Induction-     Postoperative Plan- .   Monitoring Plan - Monitoring plan - standard ASA monitoring          Informed Consent- Anesthetic plan and risks discussed with patient.  I personally reviewed this patient with the CRNA. Discussed and agreed on the Anesthesia Plan with the CRNA..      NPO Status:  No vitals data found for the desired time range.

## 2025-06-06 PROCEDURE — 88305 TISSUE EXAM BY PATHOLOGIST: CPT | Performed by: PATHOLOGY

## 2025-06-09 ENCOUNTER — RESULTS FOLLOW-UP (OUTPATIENT)
Dept: GASTROENTEROLOGY | Facility: MEDICAL CENTER | Age: 70
End: 2025-06-09

## 2025-06-09 ENCOUNTER — RESULTS FOLLOW-UP (OUTPATIENT)
Dept: OTHER | Facility: HOSPITAL | Age: 70
End: 2025-06-09

## 2025-06-09 ENCOUNTER — APPOINTMENT (OUTPATIENT)
Dept: LAB | Facility: HOSPITAL | Age: 70
End: 2025-06-09
Payer: MEDICARE

## 2025-06-09 DIAGNOSIS — E87.5 HYPERKALEMIA: ICD-10-CM

## 2025-06-09 LAB
ALBUMIN SERPL BCG-MCNC: 3.9 G/DL (ref 3.5–5)
ALP SERPL-CCNC: 56 U/L (ref 34–104)
ALT SERPL W P-5'-P-CCNC: 14 U/L (ref 7–52)
ANION GAP SERPL CALCULATED.3IONS-SCNC: 10 MMOL/L (ref 4–13)
AST SERPL W P-5'-P-CCNC: 16 U/L (ref 13–39)
BACTERIA UR QL AUTO: ABNORMAL /HPF
BASOPHILS # BLD AUTO: 0.05 THOUSANDS/ÂΜL (ref 0–0.1)
BASOPHILS NFR BLD AUTO: 1 % (ref 0–1)
BILIRUB SERPL-MCNC: 0.44 MG/DL (ref 0.2–1)
BILIRUB UR QL STRIP: NEGATIVE
BUN SERPL-MCNC: 16 MG/DL (ref 5–25)
CALCIUM SERPL-MCNC: 9.4 MG/DL (ref 8.4–10.2)
CHLORIDE SERPL-SCNC: 99 MMOL/L (ref 96–108)
CLARITY UR: ABNORMAL
CO2 SERPL-SCNC: 28 MMOL/L (ref 21–32)
COLOR UR: YELLOW
CREAT SERPL-MCNC: 0.73 MG/DL (ref 0.6–1.3)
CREAT UR-MCNC: 76.3 MG/DL
CREAT UR-MCNC: 77.4 MG/DL
EOSINOPHIL # BLD AUTO: 0.46 THOUSAND/ÂΜL (ref 0–0.61)
EOSINOPHIL NFR BLD AUTO: 5 % (ref 0–6)
ERYTHROCYTE [DISTWIDTH] IN BLOOD BY AUTOMATED COUNT: 13 % (ref 11.6–15.1)
GFR SERPL CREATININE-BSD FRML MDRD: 85 ML/MIN/1.73SQ M
GLUCOSE P FAST SERPL-MCNC: 95 MG/DL (ref 65–99)
GLUCOSE UR STRIP-MCNC: NEGATIVE MG/DL
HCT VFR BLD AUTO: 42.8 % (ref 34.8–46.1)
HGB BLD-MCNC: 14.2 G/DL (ref 11.5–15.4)
HGB UR QL STRIP.AUTO: ABNORMAL
IMM GRANULOCYTES # BLD AUTO: 0.02 THOUSAND/UL (ref 0–0.2)
IMM GRANULOCYTES NFR BLD AUTO: 0 % (ref 0–2)
KETONES UR STRIP-MCNC: NEGATIVE MG/DL
LEUKOCYTE ESTERASE UR QL STRIP: ABNORMAL
LYMPHOCYTES # BLD AUTO: 3.3 THOUSANDS/ÂΜL (ref 0.6–4.47)
LYMPHOCYTES NFR BLD AUTO: 35 % (ref 14–44)
MAGNESIUM SERPL-MCNC: 1.9 MG/DL (ref 1.9–2.7)
MCH RBC QN AUTO: 29.8 PG (ref 26.8–34.3)
MCHC RBC AUTO-ENTMCNC: 33.2 G/DL (ref 31.4–37.4)
MCV RBC AUTO: 90 FL (ref 82–98)
MICROALBUMIN UR-MCNC: 16.6 MG/L
MICROALBUMIN/CREAT 24H UR: 22 MG/G CREATININE (ref 0–30)
MONOCYTES # BLD AUTO: 0.69 THOUSAND/ÂΜL (ref 0.17–1.22)
MONOCYTES NFR BLD AUTO: 7 % (ref 4–12)
NEUTROPHILS # BLD AUTO: 4.82 THOUSANDS/ÂΜL (ref 1.85–7.62)
NEUTS SEG NFR BLD AUTO: 52 % (ref 43–75)
NITRITE UR QL STRIP: NEGATIVE
NON-SQ EPI CELLS URNS QL MICRO: ABNORMAL /HPF
NRBC BLD AUTO-RTO: 0 /100 WBCS
PH UR STRIP.AUTO: 6.5 [PH]
PHOSPHATE SERPL-MCNC: 3.6 MG/DL (ref 2.3–4.1)
PLATELET # BLD AUTO: 272 THOUSANDS/UL (ref 149–390)
PMV BLD AUTO: 9.3 FL (ref 8.9–12.7)
POTASSIUM SERPL-SCNC: 3.6 MMOL/L (ref 3.5–5.3)
PROT SERPL-MCNC: 7.2 G/DL (ref 6.4–8.4)
PROT UR STRIP-MCNC: NEGATIVE MG/DL
PROT UR-MCNC: 9.3 MG/DL
PROT/CREAT UR: 0.1 MG/G{CREAT}
PTH-INTACT SERPL-MCNC: 34.2 PG/ML (ref 12–88)
RBC # BLD AUTO: 4.76 MILLION/UL (ref 3.81–5.12)
RBC #/AREA URNS AUTO: ABNORMAL /HPF
SODIUM SERPL-SCNC: 137 MMOL/L (ref 135–147)
SP GR UR STRIP.AUTO: 1.02 (ref 1–1.03)
UROBILINOGEN UR STRIP-ACNC: <2 MG/DL
WBC # BLD AUTO: 9.34 THOUSAND/UL (ref 4.31–10.16)
WBC #/AREA URNS AUTO: ABNORMAL /HPF

## 2025-06-09 PROCEDURE — 83970 ASSAY OF PARATHORMONE: CPT

## 2025-06-09 PROCEDURE — 81001 URINALYSIS AUTO W/SCOPE: CPT

## 2025-06-09 PROCEDURE — 83735 ASSAY OF MAGNESIUM: CPT

## 2025-06-09 PROCEDURE — 84156 ASSAY OF PROTEIN URINE: CPT

## 2025-06-09 PROCEDURE — 36415 COLL VENOUS BLD VENIPUNCTURE: CPT

## 2025-06-09 PROCEDURE — 82570 ASSAY OF URINE CREATININE: CPT

## 2025-06-09 PROCEDURE — 84100 ASSAY OF PHOSPHORUS: CPT

## 2025-06-09 PROCEDURE — 85025 COMPLETE CBC W/AUTO DIFF WBC: CPT

## 2025-06-09 PROCEDURE — 82043 UR ALBUMIN QUANTITATIVE: CPT

## 2025-06-09 PROCEDURE — 80053 COMPREHEN METABOLIC PANEL: CPT

## 2025-06-16 ENCOUNTER — CONSULT (OUTPATIENT)
Dept: NEPHROLOGY | Facility: CLINIC | Age: 70
End: 2025-06-16
Payer: MEDICARE

## 2025-06-16 VITALS
OXYGEN SATURATION: 93 % | HEART RATE: 76 BPM | HEIGHT: 63 IN | RESPIRATION RATE: 16 BRPM | SYSTOLIC BLOOD PRESSURE: 144 MMHG | TEMPERATURE: 98.5 F | DIASTOLIC BLOOD PRESSURE: 77 MMHG | WEIGHT: 202.4 LBS | BODY MASS INDEX: 35.86 KG/M2

## 2025-06-16 DIAGNOSIS — E03.9 HYPOTHYROIDISM: ICD-10-CM

## 2025-06-16 DIAGNOSIS — I10 PRIMARY HYPERTENSION: ICD-10-CM

## 2025-06-16 DIAGNOSIS — M79.606 LEG PAIN: ICD-10-CM

## 2025-06-16 DIAGNOSIS — E87.6 HYPOKALEMIA: Primary | ICD-10-CM

## 2025-06-16 PROCEDURE — 99204 OFFICE O/P NEW MOD 45 MIN: CPT | Performed by: INTERNAL MEDICINE

## 2025-06-16 RX ORDER — CALCIUM CARBONATE/VITAMIN D3 500MG-5MCG
1 TABLET ORAL 2 TIMES DAILY
COMMUNITY

## 2025-06-16 RX ORDER — MULTIVIT WITH MINERALS/LUTEIN
400 TABLET ORAL DAILY
COMMUNITY

## 2025-06-16 NOTE — PROGRESS NOTES
[facility] [place]  Consultation - Nephrology    Sheron Marquez 69 y.o. female MRN: [id number]      A/P:    1. Hypokalemia  -     Renin Activity, Plasma; Future; Expected date: Collect anytime  -     Aldosterone; Future; Expected date: Collect anytime  -     TSH w/Reflex; Future; Expected date: Collect anytime  -     Comprehensive metabolic panel; Future; Expected date: 09/02/2025  -     Magnesium; Future; Expected date: 09/02/2025  2. Primary hypertension  3. Hypothyroidism  4. Leg pain         Intermittent, mild hypokalemia.  Most recent trend normal. K noted to be 3.3 since 2019.     Denies alcohol use, chronic gi losses, malabsorption, black licorice.  She is maintained on HCTZ which can cause potassium wasting in the urine.  Explained that urinary testing needs to be interpreted in context of serum levels. If her serum potassium was normal, the urinary level would not be helpful.   For completeness, will repeat TSH and check plasma renin and aldosterone.   She may benefit from a potassium sparing diuretic to help with hypertension and manage potassium.  She is currently taking OTC potassium supplement BID, unclear dose.    Cr trend normal in June. Baseline 0.7-0.8 mg/dl.   May consider genetic testing as well, but holding off at this time.     2. Primary HTN   Current regimen   HCTZ 25 mg/day (taking 1/2 pill of 50mg/day ) and amlodipine-benazepril 10-20mg/day. SBP mildly elevated in the office.   Agree with staying on 1/2 dosing of HCTZ.   She may benefit from changing HCTZ to potassium sparing diuretic to help with K wasting.    3 hypothyroidism  Currently on levothyroxine 50 mcg/day.  Recheck Tsh to assess trends.     4. Leg pain  She had PAD from 4/21/2025 and should fu with vascular as appropriate.   It is unclear if any of her leg pain is due to electrolyte disorder, suspect this is less likely the case given mild intermittent nature.    Fu in 3 months with labs prior.    I  have reviewed pertinent labs and imaging with patient.      The patient and any family members present were counseled today on risks benefits and alternatives of any medications, and were agreeable to the treatment plan.  They were counseled on diagnostic testing if necessary, and projected outcomes as are available and medically indicated.  All questions were asked and answered during the encounter. If more questions are to arise the patient will call the office. Alarm and return precautions discussed and accepted.       Thank you for allowing us to participate in the care of your patient.        History of Present Illness   Physician Requesting Consult: No att. providers found    HPI: Sheron Marquez is a 69 y.o. year old female who presents for hypokalemia evaluation in the [place] office. Reports she has a hx HTN and is on 3 antihypertensives. Hx HTN x 20 years. Reports on HCTZ 50mg/day and cut back to 1/2 until sees office. She is on amlodipine-benazpril 10-20 mg/day, previously on 5-10mg/day, but + 5 years.  Denies black licorice use and chronic GI losses. Denies chronic alcohol use or leg swelling. No family hx hypokalemia.  Potassium noted to be intermittently low at 3.3 dating back to 2019 . Magnesium normal at 1.9. Thyroid testing normal October 2024. She is on levothyroxine 50 mcg/day.     She had 1-2 years of anterior tibilais weakness and thought it may be due to low potassim , low magnesium. She takes 1-2 potassium pills or gummies.   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 She takes potassium OTC 3-4 times a day.   She tried aleve spray, biofreeze to help with pain.    She follows with urology for microscopic hematuria and scheduled     Denies GI losses.     Denies PPI medications.    Reports hx soft palate growth  Reports chronic fatigue.      Constitutional ROS- Denies fatigue, fever, chills, night sweats, weight changes.  HEENT ROS- Denies headaches or history of trauma, blurred  vision..  Endocrine ROS-+ thyroid disease   Cardiovascular ROS- Denies chest pain, palpitation, dyspnea exertion, orthopnea, claudication.  Pulmonary ROS-  Denies cough, hemoptysis, shortness of breath.  GI ROS- Denies abdominal pain, diarrhea, nausea, swallowing problems, vomiting, constipation, blood in stools, fecal incontinence.   Hematological ROS- Denies history of easy bruising, blood clots, bleeding or blood transfusions.  Genitourinary ROS- Denies recent hematuria, pyuria, flank pain, change in urinary stream, decreased urinary output, increased urinary frequency, nocturia, foamy urine, or urinary incontinence.  Lymphatic ROS- Denies lymphadenopathy.  Musculoskeletal ROS- right leg pain  Dermatological ROS- Denies rash, wounds, ulcers, itching, jaundice.  Psychiatric ROS- Denies hallucinations, disorientation.  Neurological ROS- No stroke or TIA symptoms.     Historical Information   Past Medical History[1]  Past Surgical History[2]  Social History   Social History     Substance and Sexual Activity   Alcohol Use Not Currently     Social History     Substance and Sexual Activity   Drug Use Not Currently     Tobacco Use History[3]  Family History[4]    Meds/Allergies   all current active meds have been reviewed, current meds:   Current Medications[5]     Allergies[6]    Objective     Vitals:    06/16/25 1251   BP: 144/77   Pulse: 76   Resp: 16   Temp: 98.5 °F (36.9 °C)   SpO2: 93%       General Appearance:    No acute distress. Cooperative. Appears stated age.   Head:    Normocephalic. Atraumatic.    Eyes:    Lids, conjunctiva normal. No scleral icterus.   Ears:    Normal external ears.   Nose:   Nares normal. No drainage.   Mouth:   Lips, tongue normal. Mucosa normal. Phonation normal.   Neck:   Supple. Symmetrical.   Back:     Symmetric. No CVA tenderness.   Lungs:     Normal respiratory effort. Clear to auscultation bilaterally.   Chest wall:    No tenderness or deformity.   Heart:    Regular rate and  rhythm. Normal S1 and S2. No murmur. No JVD. No edema.   Abdomen:     Soft. Non-tender. Bowel sounds active.   Genitourinary:      Extremities:   Extremities normal. Atraumatic. No cyanosis.   Skin:   Warm and dry. No pallor, jaundice, rash, ecchymoses.   Neurologic:   Alert and oriented to person, place, time. No focal deficit.         Current Weight: Weight - Scale: 91.8 kg (202 lb 6.4 oz)    First Weight:    Wt Readings from Last 3 Encounters:   06/16/25 91.8 kg (202 lb 6.4 oz)   06/04/25 90.7 kg (200 lb)   04/30/25 90.7 kg (200 lb)        Lab Results:  I have personally reviewed pertinent labs.    6/9/25  Na 137, K 3.6, Cl 99, TCO2 28, BUN 16, Cr 0.73, eGFR 85 ml/min.        Radiology review:    5/13/25  Ct renal protocol     RIGHT KIDNEY AND URETER:  No solid renal mass or detectable urothelial mass. Simple renal cyst(s).  No hydronephrosis or hydroureter.  No urinary tract calculi.  No perinephric collection.     LEFT KIDNEY AND URETER:  No solid renal mass or detectable urothelial mass. Simple renal cyst(s).  No hydronephrosis or hydroureter.  No urinary tract calculi.  No perinephric collection.     URINARY BLADDER:  No bladder wall mass.  No calculi.      Estefany Mcdowell DO      This consultation note was produced in part using a dictation device which may document imprecise wording from author's original intent.      The following portions of the patient's history were reviewed and updated as appropriate: allergies, current medications, past family history, past medical history, past social history, past surgical history, and problem list.         [1]   Past Medical History:  Diagnosis Date    Disease of thyroid gland     Hyperlipidemia     Hypertension     Obesity Don’t know   [2]   Past Surgical History:  Procedure Laterality Date    BREAST SURGERY Right     right breast lumpectomy about 20 yrs ago    DILATION AND CURETTAGE OF UTERUS      HYSTERECTOMY      TONSILLECTOMY      TOTAL VAGINAL HYSTERECTOMY   2003   [3]   Social History  Tobacco Use   Smoking Status Every Day    Current packs/day: 0.75    Average packs/day: 0.8 packs/day for 32.0 years (24.0 ttl pk-yrs)    Types: Cigarettes   Smokeless Tobacco Never   [4] No family history on file.  [5]   Current Outpatient Medications:     amLODIPine-benazepril (LOTREL) 10-20 MG per capsule, take 1 capsule by mouth once daily, Disp: 90 capsule, Rfl: 1    ascorbic Acid (VITAMIN C) 500 MG CPCR, Take 500 mg by mouth daily, Disp: , Rfl:     atorvastatin (LIPITOR) 10 mg tablet, Take 1 tablet (10 mg total) by mouth daily, Disp: 100 tablet, Rfl: 1    Calcium Carb-Cholecalciferol (Calcium 500+D) 500-5 MG-MCG TABS, Take 1 tablet by mouth 2 (two) times a day, Disp: , Rfl:     Cholecalciferol 50 MCG (2000 UT) CAPS, Take 1 capsule by mouth, Disp: , Rfl:     colesevelam (WELCHOL) 625 mg tablet, Take 2 tablets (1,250 mg total) by mouth 2 (two) times a day with meals, Disp: 540 tablet, Rfl: 1    Cyanocobalamin (VITAMIN B 12 PO), Take 1 capsule by mouth daily, Disp: , Rfl:     diclofenac (VOLTAREN) 75 mg EC tablet, Take 1 tablet (75 mg total) by mouth 2 (two) times a day (Patient taking differently: Take 75 mg by mouth if needed), Disp: 60 tablet, Rfl: 2    Diclofenac Sodium (VOLTAREN) 1 %, Apply 2 g topically 4 (four) times a day, Disp: 100 g, Rfl: 9    Fish Oil-Cholecalciferol (FISH OIL + D3 PO), Take 2 g by mouth daily, Disp: , Rfl:     hydroCHLOROthiazide 50 mg tablet, Take 0.5 tablets (25 mg total) by mouth daily, Disp: 90 tablet, Rfl: 1    levothyroxine 50 mcg tablet, take 1 tablet by mouth once daily (Patient taking differently: Take 25 mcg by mouth in the morning.), Disp: 90 tablet, Rfl: 1    meclizine (ANTIVERT) 25 mg tablet, Take 1 tablet (25 mg total) by mouth every 8 (eight) hours (Patient taking differently: Take 25 mg by mouth every 8 (eight) hours as needed for dizziness), Disp: 30 tablet, Rfl: 2    NON FORMULARY, Calcium 333mg-Magnesium 133mg-Zinc 200IU; one tablet BID,  Disp: , Rfl:     POTASSIUM PO, Take by mouth Potassium 99mg, take 2 tablets daily, Disp: , Rfl:     vitamin E, tocopherol, 1,000 units capsule, Take 400 Units by mouth daily, Disp: , Rfl:   [6] No Known Allergies

## 2025-06-16 NOTE — PATIENT INSTRUCTIONS
Thanks for coming in today, your potassium has been intermittently low dating back to 2019. We discussed potential causes including diuretic like hydrochlorothiazide and hormonal disorders. Advise to check workup for hyperaldosteronism. Will check renin and aldosterone.   Consideration for medication options include amiloride and spirinolactone.

## 2025-06-21 ENCOUNTER — APPOINTMENT (OUTPATIENT)
Dept: LAB | Facility: HOSPITAL | Age: 70
End: 2025-06-21
Attending: INTERNAL MEDICINE
Payer: MEDICARE

## 2025-06-21 DIAGNOSIS — E87.6 HYPOKALEMIA: ICD-10-CM

## 2025-06-21 LAB — TSH SERPL DL<=0.05 MIU/L-ACNC: 2.02 UIU/ML (ref 0.45–4.5)

## 2025-06-21 PROCEDURE — 36415 COLL VENOUS BLD VENIPUNCTURE: CPT

## 2025-06-21 PROCEDURE — 84244 ASSAY OF RENIN: CPT

## 2025-06-21 PROCEDURE — 82088 ASSAY OF ALDOSTERONE: CPT

## 2025-06-21 PROCEDURE — 84443 ASSAY THYROID STIM HORMONE: CPT

## 2025-06-23 ENCOUNTER — RESULTS FOLLOW-UP (OUTPATIENT)
Age: 70
End: 2025-06-23

## 2025-06-25 ENCOUNTER — TELEPHONE (OUTPATIENT)
Age: 70
End: 2025-06-25

## 2025-06-25 LAB — ALDOST SERPL-MCNC: 7.5 NG/DL (ref 0–30)

## 2025-06-25 NOTE — TELEPHONE ENCOUNTER
Lvm for pt to call us back to r/s    Please r/s appt from 9/23 in [facility] office Dr Mcdowell schedule to Dr Hogan.

## 2025-07-01 LAB — RENIN PLAS-CCNC: 1.32 NG/ML/HR (ref 0.17–5.38)

## 2025-07-11 NOTE — TELEPHONE ENCOUNTER
Patient called to change her pharmacy from [facility] to [facility] and then to local [facility] in [place].  Patient isnt due for medication refills yet and will call 7-10 days before they are due [facility].

## 2025-07-15 ENCOUNTER — PROCEDURE VISIT (OUTPATIENT)
Age: 70
End: 2025-07-15
Payer: MEDICARE

## 2025-07-15 ENCOUNTER — TELEPHONE (OUTPATIENT)
Age: 70
End: 2025-07-15

## 2025-07-15 VITALS
OXYGEN SATURATION: 95 % | SYSTOLIC BLOOD PRESSURE: 146 MMHG | TEMPERATURE: 98.5 F | HEIGHT: 63 IN | BODY MASS INDEX: 35.97 KG/M2 | HEART RATE: 78 BPM | DIASTOLIC BLOOD PRESSURE: 78 MMHG | WEIGHT: 203 LBS

## 2025-07-15 DIAGNOSIS — F17.210 SMOKING GREATER THAN 20 PACK YEARS: ICD-10-CM

## 2025-07-15 DIAGNOSIS — R31.29 MICROSCOPIC HEMATURIA: Primary | ICD-10-CM

## 2025-07-15 DIAGNOSIS — Z80.52 FAMILY HISTORY OF BLADDER CANCER: ICD-10-CM

## 2025-07-15 PROCEDURE — 52000 CYSTOURETHROSCOPY: CPT | Performed by: UROLOGY

## 2025-07-15 PROCEDURE — 99213 OFFICE O/P EST LOW 20 MIN: CPT | Performed by: UROLOGY

## 2025-07-15 RX ORDER — CEPHALEXIN 500 MG/1
500 CAPSULE ORAL ONCE
Qty: 1 CAPSULE | Refills: 0 | Status: SHIPPED | OUTPATIENT
Start: 2025-07-15 | End: 2025-07-15

## 2025-08-05 PROBLEM — M18.11 OSTEOARTHRITIS OF FIRST CARPOMETACARPAL JOINT OF RIGHT HAND: Status: ACTIVE | Noted: 2024-06-10

## 2025-08-05 PROBLEM — M25.562 ARTHRALGIA OF LEFT KNEE: Status: ACTIVE | Noted: 2023-10-13

## 2025-08-05 PROBLEM — Z86.79 HISTORY OF HYPERTENSION: Status: ACTIVE | Noted: 2024-06-10

## 2025-08-05 PROBLEM — Z86.39 HISTORY OF HYPOTHYROIDISM: Status: ACTIVE | Noted: 2024-06-10

## 2025-08-05 PROBLEM — M17.12 OSTEOARTHRITIS OF LEFT KNEE: Status: ACTIVE | Noted: 2023-10-13

## 2025-08-06 ENCOUNTER — TELEPHONE (OUTPATIENT)
Dept: VASCULAR SURGERY | Facility: CLINIC | Age: 70
End: 2025-08-06

## 2025-08-06 ENCOUNTER — OFFICE VISIT (OUTPATIENT)
Dept: VASCULAR SURGERY | Facility: CLINIC | Age: 70
End: 2025-08-06
Attending: FAMILY MEDICINE
Payer: MEDICARE

## 2025-08-06 VITALS
SYSTOLIC BLOOD PRESSURE: 140 MMHG | OXYGEN SATURATION: 97 % | DIASTOLIC BLOOD PRESSURE: 72 MMHG | BODY MASS INDEX: 35.3 KG/M2 | HEIGHT: 63 IN | HEART RATE: 78 BPM | WEIGHT: 199.2 LBS

## 2025-08-06 DIAGNOSIS — I73.9 CLAUDICATION (HCC): ICD-10-CM

## 2025-08-06 DIAGNOSIS — I73.9 PERIPHERAL ARTERIAL DISEASE (HCC): Primary | ICD-10-CM

## 2025-08-06 DIAGNOSIS — Z72.0 TOBACCO ABUSE: ICD-10-CM

## 2025-08-06 PROCEDURE — 99203 OFFICE O/P NEW LOW 30 MIN: CPT

## 2025-08-06 RX ORDER — ASPIRIN 81 MG/1
81 TABLET, CHEWABLE ORAL DAILY
Start: 2025-08-06